# Patient Record
Sex: MALE | Race: WHITE | NOT HISPANIC OR LATINO | Employment: UNEMPLOYED | URBAN - METROPOLITAN AREA
[De-identification: names, ages, dates, MRNs, and addresses within clinical notes are randomized per-mention and may not be internally consistent; named-entity substitution may affect disease eponyms.]

---

## 2022-02-07 ENCOUNTER — OFFICE VISIT (OUTPATIENT)
Dept: INTERNAL MEDICINE | Facility: CLINIC | Age: 27
End: 2022-02-07
Payer: COMMERCIAL

## 2022-02-07 VITALS
HEART RATE: 118 BPM | OXYGEN SATURATION: 98 % | TEMPERATURE: 98.1 F | DIASTOLIC BLOOD PRESSURE: 83 MMHG | BODY MASS INDEX: 23.94 KG/M2 | SYSTOLIC BLOOD PRESSURE: 138 MMHG | WEIGHT: 171 LBS | RESPIRATION RATE: 20 BRPM | HEIGHT: 71 IN

## 2022-02-07 DIAGNOSIS — Z11.59 NEED FOR HEPATITIS C SCREENING TEST: ICD-10-CM

## 2022-02-07 DIAGNOSIS — Z11.4 SCREENING FOR HIV (HUMAN IMMUNODEFICIENCY VIRUS): ICD-10-CM

## 2022-02-07 DIAGNOSIS — F41.9 ANXIETY AND DEPRESSION: ICD-10-CM

## 2022-02-07 DIAGNOSIS — Z00.00 ROUTINE GENERAL MEDICAL EXAMINATION AT A HEALTH CARE FACILITY: Primary | ICD-10-CM

## 2022-02-07 DIAGNOSIS — F12.188 CANNABIS HYPEREMESIS SYNDROME CONCURRENT WITH AND DUE TO CANNABIS ABUSE (H): ICD-10-CM

## 2022-02-07 DIAGNOSIS — F32.A ANXIETY AND DEPRESSION: ICD-10-CM

## 2022-02-07 LAB
BASOPHILS # BLD AUTO: 0 10E3/UL (ref 0–0.2)
BASOPHILS NFR BLD AUTO: 0 %
EOSINOPHIL # BLD AUTO: 0.1 10E3/UL (ref 0–0.7)
EOSINOPHIL NFR BLD AUTO: 1 %
ERYTHROCYTE [DISTWIDTH] IN BLOOD BY AUTOMATED COUNT: 12.7 % (ref 10–15)
HCT VFR BLD AUTO: 46.5 % (ref 40–53)
HGB BLD-MCNC: 15.9 G/DL (ref 13.3–17.7)
HIV 1+2 AB+HIV1 P24 AG SERPL QL IA: NONREACTIVE
LYMPHOCYTES # BLD AUTO: 1.5 10E3/UL (ref 0.8–5.3)
LYMPHOCYTES NFR BLD AUTO: 17 %
MCH RBC QN AUTO: 30.5 PG (ref 26.5–33)
MCHC RBC AUTO-ENTMCNC: 34.2 G/DL (ref 31.5–36.5)
MCV RBC AUTO: 89 FL (ref 78–100)
MONOCYTES # BLD AUTO: 0.7 10E3/UL (ref 0–1.3)
MONOCYTES NFR BLD AUTO: 8 %
NEUTROPHILS # BLD AUTO: 6.4 10E3/UL (ref 1.6–8.3)
NEUTROPHILS NFR BLD AUTO: 73 %
PLATELET # BLD AUTO: 141 10E3/UL (ref 150–450)
RBC # BLD AUTO: 5.22 10E6/UL (ref 4.4–5.9)
WBC # BLD AUTO: 8.8 10E3/UL (ref 4–11)

## 2022-02-07 PROCEDURE — 99385 PREV VISIT NEW AGE 18-39: CPT | Performed by: INTERNAL MEDICINE

## 2022-02-07 PROCEDURE — 99214 OFFICE O/P EST MOD 30 MIN: CPT | Mod: 25 | Performed by: INTERNAL MEDICINE

## 2022-02-07 PROCEDURE — 86803 HEPATITIS C AB TEST: CPT | Performed by: INTERNAL MEDICINE

## 2022-02-07 PROCEDURE — 87389 HIV-1 AG W/HIV-1&-2 AB AG IA: CPT | Performed by: INTERNAL MEDICINE

## 2022-02-07 PROCEDURE — 80050 GENERAL HEALTH PANEL: CPT | Performed by: INTERNAL MEDICINE

## 2022-02-07 PROCEDURE — 36415 COLL VENOUS BLD VENIPUNCTURE: CPT | Performed by: INTERNAL MEDICINE

## 2022-02-07 PROCEDURE — 96127 BRIEF EMOTIONAL/BEHAV ASSMT: CPT | Performed by: INTERNAL MEDICINE

## 2022-02-07 ASSESSMENT — ENCOUNTER SYMPTOMS
HEMATOCHEZIA: 0
HEMATURIA: 0
WEAKNESS: 1
HEADACHES: 1
SORE THROAT: 0
DYSURIA: 0
CONSTIPATION: 0
ARTHRALGIAS: 1
FREQUENCY: 0
FEVER: 0
SHORTNESS OF BREATH: 1
NERVOUS/ANXIOUS: 1
PARESTHESIAS: 0
HEARTBURN: 1
DIZZINESS: 1
CHILLS: 0
EYE PAIN: 0
MYALGIAS: 1
NAUSEA: 1
COUGH: 1
JOINT SWELLING: 0
PALPITATIONS: 0
DIARRHEA: 0
ABDOMINAL PAIN: 1

## 2022-02-07 ASSESSMENT — PATIENT HEALTH QUESTIONNAIRE - PHQ9
SUM OF ALL RESPONSES TO PHQ QUESTIONS 1-9: 25
SUM OF ALL RESPONSES TO PHQ QUESTIONS 1-9: 25
10. IF YOU CHECKED OFF ANY PROBLEMS, HOW DIFFICULT HAVE THESE PROBLEMS MADE IT FOR YOU TO DO YOUR WORK, TAKE CARE OF THINGS AT HOME, OR GET ALONG WITH OTHER PEOPLE: EXTREMELY DIFFICULT

## 2022-02-07 ASSESSMENT — MIFFLIN-ST. JEOR: SCORE: 1767.78

## 2022-02-07 NOTE — PROGRESS NOTES
"SUBJECTIVE:   CC: Favio Will is an 27 year old male who presents for preventative health visit.       Patient has been advised of split billing requirements and indicates understanding: Yes  Patient is a 27-year-old  male who presents for his annual physical.  Patient does have several concerns today in regards to his anxiety and depression.  He is dealt with his mental health issues for the past several years.  Has previously utilized Klonopin on an as-needed basis for management of his anxiety, but he states that he stopped all medication as \"nothing ever works for me.\"  He would later states that he has not been on any other medications for management of his anxiety and depression.  Patient has been attempting to self medicate with very heavy marijuana use.  He has noted over the past 2 years that anytime he will smoke marijuana then he starts to feel very ill.  Patient has vomited multiple times after any type of marijuana use.  He also reports abdominal pain and a sense of feeling unwell.  Patient is very concerned that there is an underlying issue that has not yet been discovered that is making him feel so badly.  He states that he does struggle with frequent marijuana use, and he is currently smoking approximately 1/8 of marijuana each day.  He does report that he was smoking significantly more over the past several months, but he has run out of money.  Patient does live with his mother and stepfather, and he is currently unemployed.  He does state that he has a very fluctuant appetite.  His bowel habits can be very variable as well.  Patient reports that he does feel like he sleeps a lot, but he is always very tired.  He does report issues with depressed mood, anhedonia, excessive worrying, difficulty relaxing, lack of motivation, and concentration difficulties.  Patient is accompanied by his stepfather today who does help provide some of his history as the patient does get very fixated on " discussing how he feels after smoking marijuana.  Patient does state multiple times that he does not feel that medications will work for him, and he needs alternative treatment methods.  Patient also repeat several times that he just feels very sick.    Healthy Habits:     Getting at least 3 servings of Calcium per day:  NO    Bi-annual eye exam:  NO    Dental care twice a year:  NO    Sleep apnea or symptoms of sleep apnea:  Daytime drowsiness    Diet:  Regular (no restrictions)    Frequency of exercise:  None    Taking medications regularly:  Yes    Medication side effects:  None    PHQ-2 Total Score: 6    Additional concerns today:  No      Today's PHQ-2 Score:   PHQ-2 ( 1999 [employer]) 2/7/2022   Q1: Little interest or pleasure in doing things 3   Q2: Feeling down, depressed or hopeless 3   PHQ-2 Score 6   Q1: Little interest or pleasure in doing things Nearly every day   Q2: Feeling down, depressed or hopeless Nearly every day   PHQ-2 Score 6       Abuse: Current or Past(Physical, Sexual or Emotional)- No  Do you feel safe in your environment? Yes      Social History     Tobacco Use     Smoking status: Former Smoker     Packs/day: 0.50     Smokeless tobacco: Never Used   Substance Use Topics     Alcohol use: Yes     Comment: not much, sometimes on weekends       Alcohol Use 2/7/2022   Prescreen: >3 drinks/day or >7 drinks/week? No       Last PSA: No results found for: PSA    Reviewed orders with patient. Reviewed health maintenance and updated orders accordingly - Yes  Lab work is in process    Reviewed and updated as needed this visit by clinical staff  Tobacco  Allergies  Meds  Problems  Med Hx  Surg Hx  Fam Hx  Soc Hx         Reviewed and updated as needed this visit by Provider    Meds  Problems             Review of Systems   Constitutional: Negative for chills and fever.   HENT: Negative for congestion, ear pain, hearing loss and sore throat.    Eyes: Negative for pain and visual disturbance.  "  Respiratory: Positive for cough and shortness of breath.    Cardiovascular: Negative for chest pain, palpitations and peripheral edema.   Gastrointestinal: Positive for abdominal pain, heartburn and nausea. Negative for constipation, diarrhea and hematochezia.   Genitourinary: Negative for dysuria, frequency, genital sores, hematuria, impotence, penile discharge and urgency.   Musculoskeletal: Positive for arthralgias and myalgias. Negative for joint swelling.   Skin: Negative for rash.   Neurological: Positive for dizziness, weakness and headaches. Negative for paresthesias.   Psychiatric/Behavioral: Positive for mood changes. The patient is nervous/anxious.          OBJECTIVE:   /83   Pulse 118   Temp 98.1  F (36.7  C)   Resp 20   Ht 1.803 m (5' 11\")   Wt 77.6 kg (171 lb)   SpO2 98%   BMI 23.85 kg/m      Physical Exam  Vitals and nursing note reviewed.   HENT:      Head: Normocephalic and atraumatic.      Right Ear: Tympanic membrane, ear canal and external ear normal.      Left Ear: Tympanic membrane, ear canal and external ear normal.      Mouth/Throat:      Mouth: Mucous membranes are moist.      Pharynx: Oropharynx is clear.   Eyes:      Extraocular Movements: Extraocular movements intact.      Conjunctiva/sclera: Conjunctivae normal.      Pupils: Pupils are equal, round, and reactive to light.   Cardiovascular:      Rate and Rhythm: Normal rate and regular rhythm.      Pulses: Normal pulses.      Heart sounds: Normal heart sounds.   Pulmonary:      Effort: Pulmonary effort is normal.      Breath sounds: Normal breath sounds.   Abdominal:      General: Bowel sounds are normal.      Palpations: Abdomen is soft.   Musculoskeletal:      Cervical back: Normal range of motion and neck supple.   Skin:     General: Skin is warm.      Capillary Refill: Capillary refill takes less than 2 seconds.   Neurological:      General: No focal deficit present.      Mental Status: He is alert and oriented to " "person, place, and time.   Psychiatric:         Attention and Perception: He is inattentive.         Mood and Affect: Mood is anxious.         Speech: Speech is rapid and pressured.         Thought Content: Thought content normal.       Diagnostic Test Results: CMP, CBC, and TSH with reflex free T4 is pending.    ASSESSMENT/PLAN:   (Z00.00) Routine general medical examination at a health care facility  (primary encounter diagnosis)  Comment: At this time, patient does have a relatively unremarkable physical examination.  His blood pressure is noted to be in acceptable level.  Patient is not fasting, but we will have some lab work collected today.  He does have a CMP and CBC that is currently pending.  We did spend some time briefly discussing appropriate dietary and lifestyle modifications that can help keep his weight and blood pressure under good control.  We will contact him with results of his labs once they are available for review.    (Z11.4) Screening for HIV (human immunodeficiency virus)  Comment: HIV Antigen Antibody Combo is pending.    (Z11.59) Need for hepatitis C screening test  Comment: Hepatitis C Screen Reflex to HCV RNA Quant and         Genotype is pending.    (F41.9,  F32.A) Anxiety and depression  Comment: At this time, patient appears to be struggling significantly with anxiety and depression.  He would appear that he has been self-medicating with marijuana and other illicit substances over the past several years.  Patient is quite adamant that medications will not work for him, but he does admit that he has \"never really been tried on anything before.\"  After much discussion, we did lead to have baseline lab work collected to ensure that there are no other underlying issues that could be contributing to his sense of feeling unwell.  Once all lab results are available for review, then we can determine a better plan for management of his mental health issues.    (F12.188) Cannabis hyperemesis " "syndrome concurrent with and due to cannabis abuse (H)  Comment: I suspect that his issues with vomiting following marijuana use are likely related to cannabis hyperemesis syndrome.  I did stress with him the importance of stopping all marijuana use.  This was reiterated to the patient multiple times.    Patient has been advised of split billing requirements and indicates understanding: Yes    COUNSELING:   Reviewed preventive health counseling, as reflected in patient instructions    Estimated body mass index is 23.85 kg/m  as calculated from the following:    Height as of this encounter: 1.803 m (5' 11\").    Weight as of this encounter: 77.6 kg (171 lb).         He reports that he has quit smoking. He smoked 0.50 packs per day. He has never used smokeless tobacco.      Counseling Resources:  ATP IV Guidelines  Pooled Cohorts Equation Calculator  FRAX Risk Assessment  ICSI Preventive Guidelines  Dietary Guidelines for Americans, 2010  USDA's MyPlate  ASA Prophylaxis  Lung CA Screening    Calvin Merrill MD  [facility]  Answers for HPI/ROS submitted by the patient on 2/7/2022  If you checked off any problems, how difficult have these problems made it for you to do your work, take care of things at home, or get along with other people?: Extremely difficult  PHQ9 TOTAL SCORE: 25      "

## 2022-02-08 LAB
ALBUMIN SERPL-MCNC: 4.2 G/DL (ref 3.4–5)
ALP SERPL-CCNC: 80 U/L (ref 40–150)
ALT SERPL W P-5'-P-CCNC: 41 U/L (ref 0–70)
ANION GAP SERPL CALCULATED.3IONS-SCNC: 4 MMOL/L (ref 3–14)
AST SERPL W P-5'-P-CCNC: 13 U/L (ref 0–45)
BILIRUB SERPL-MCNC: 0.3 MG/DL (ref 0.2–1.3)
BUN SERPL-MCNC: 11 MG/DL (ref 7–30)
CALCIUM SERPL-MCNC: 9.3 MG/DL (ref 8.5–10.1)
CHLORIDE BLD-SCNC: 101 MMOL/L (ref 94–109)
CO2 SERPL-SCNC: 31 MMOL/L (ref 20–32)
CREAT SERPL-MCNC: 0.78 MG/DL (ref 0.66–1.25)
GFR SERPL CREATININE-BSD FRML MDRD: >90 ML/MIN/1.73M2
GLUCOSE BLD-MCNC: 92 MG/DL (ref 70–99)
HCV AB SERPL QL IA: NONREACTIVE
POTASSIUM BLD-SCNC: 4 MMOL/L (ref 3.4–5.3)
PROT SERPL-MCNC: 7.8 G/DL (ref 6.8–8.8)
SODIUM SERPL-SCNC: 136 MMOL/L (ref 133–144)
TSH SERPL DL<=0.005 MIU/L-ACNC: 0.6 MU/L (ref 0.4–4)

## 2022-02-08 ASSESSMENT — PATIENT HEALTH QUESTIONNAIRE - PHQ9: SUM OF ALL RESPONSES TO PHQ QUESTIONS 1-9: 25

## 2022-02-20 ENCOUNTER — HEALTH MAINTENANCE LETTER (OUTPATIENT)
Age: 27
End: 2022-02-20

## 2022-04-15 ENCOUNTER — HOSPITAL ENCOUNTER (EMERGENCY)
Facility: CLINIC | Age: 27
Discharge: HOME | End: 2022-04-15
Attending: EMERGENCY MEDICINE | Admitting: EMERGENCY MEDICINE
Payer: COMMERCIAL

## 2022-04-15 ENCOUNTER — APPOINTMENT (OUTPATIENT)
Dept: GENERAL RADIOLOGY | Facility: CLINIC | Age: 27
End: 2022-04-15
Attending: EMERGENCY MEDICINE
Payer: COMMERCIAL

## 2022-04-15 ENCOUNTER — APPOINTMENT (OUTPATIENT)
Dept: CT IMAGING | Facility: CLINIC | Age: 27
End: 2022-04-15
Attending: EMERGENCY MEDICINE
Payer: COMMERCIAL

## 2022-04-15 VITALS
TEMPERATURE: 99.5 F | WEIGHT: 163.8 LBS | DIASTOLIC BLOOD PRESSURE: 86 MMHG | OXYGEN SATURATION: 98 % | HEIGHT: 70 IN | HEART RATE: 92 BPM | RESPIRATION RATE: 14 BRPM | BODY MASS INDEX: 23.45 KG/M2 | SYSTOLIC BLOOD PRESSURE: 118 MMHG

## 2022-04-15 DIAGNOSIS — F19.10 SUBSTANCE ABUSE (H): ICD-10-CM

## 2022-04-15 DIAGNOSIS — J10.1 INFLUENZA A: ICD-10-CM

## 2022-04-15 LAB
ALBUMIN SERPL-MCNC: 3.8 G/DL (ref 3.4–5)
ALP SERPL-CCNC: 60 U/L (ref 40–150)
ALT SERPL W P-5'-P-CCNC: 88 U/L (ref 0–70)
ANION GAP SERPL CALCULATED.3IONS-SCNC: 5 MMOL/L (ref 3–14)
AST SERPL W P-5'-P-CCNC: 55 U/L (ref 0–45)
BASOPHILS # BLD AUTO: 0 10E3/UL (ref 0–0.2)
BASOPHILS NFR BLD AUTO: 0 %
BILIRUB SERPL-MCNC: 0.4 MG/DL (ref 0.2–1.3)
BUN SERPL-MCNC: 10 MG/DL (ref 7–30)
CALCIUM SERPL-MCNC: 8.7 MG/DL (ref 8.5–10.1)
CHLORIDE BLD-SCNC: 100 MMOL/L (ref 94–109)
CO2 SERPL-SCNC: 26 MMOL/L (ref 20–32)
CREAT SERPL-MCNC: 0.82 MG/DL (ref 0.66–1.25)
EOSINOPHIL # BLD AUTO: 0 10E3/UL (ref 0–0.7)
EOSINOPHIL NFR BLD AUTO: 0 %
ERYTHROCYTE [DISTWIDTH] IN BLOOD BY AUTOMATED COUNT: 12 % (ref 10–15)
FLUAV RNA SPEC QL NAA+PROBE: POSITIVE
FLUBV RNA RESP QL NAA+PROBE: NEGATIVE
GFR SERPL CREATININE-BSD FRML MDRD: >90 ML/MIN/1.73M2
GLUCOSE BLD-MCNC: 113 MG/DL (ref 70–99)
HCT VFR BLD AUTO: 43.2 % (ref 40–53)
HGB BLD-MCNC: 15 G/DL (ref 13.3–17.7)
HOLD SPECIMEN: NORMAL
IMM GRANULOCYTES # BLD: 0 10E3/UL
IMM GRANULOCYTES NFR BLD: 0 %
LIPASE SERPL-CCNC: 52 U/L (ref 73–393)
LYMPHOCYTES # BLD AUTO: 0.6 10E3/UL (ref 0.8–5.3)
LYMPHOCYTES NFR BLD AUTO: 12 %
MCH RBC QN AUTO: 30.6 PG (ref 26.5–33)
MCHC RBC AUTO-ENTMCNC: 34.7 G/DL (ref 31.5–36.5)
MCV RBC AUTO: 88 FL (ref 78–100)
MONOCYTES # BLD AUTO: 0.9 10E3/UL (ref 0–1.3)
MONOCYTES NFR BLD AUTO: 19 %
NEUTROPHILS # BLD AUTO: 3.4 10E3/UL (ref 1.6–8.3)
NEUTROPHILS NFR BLD AUTO: 69 %
NRBC # BLD AUTO: 0 10E3/UL
NRBC BLD AUTO-RTO: 0 /100
PLATELET # BLD AUTO: 94 10E3/UL (ref 150–450)
POTASSIUM BLD-SCNC: 3.5 MMOL/L (ref 3.4–5.3)
PROT SERPL-MCNC: 7.4 G/DL (ref 6.8–8.8)
RBC # BLD AUTO: 4.9 10E6/UL (ref 4.4–5.9)
RSV RNA SPEC NAA+PROBE: NEGATIVE
SARS-COV-2 RNA RESP QL NAA+PROBE: NEGATIVE
SODIUM SERPL-SCNC: 131 MMOL/L (ref 133–144)
WBC # BLD AUTO: 5 10E3/UL (ref 4–11)

## 2022-04-15 PROCEDURE — 96375 TX/PRO/DX INJ NEW DRUG ADDON: CPT

## 2022-04-15 PROCEDURE — C9803 HOPD COVID-19 SPEC COLLECT: HCPCS

## 2022-04-15 PROCEDURE — 83690 ASSAY OF LIPASE: CPT | Performed by: EMERGENCY MEDICINE

## 2022-04-15 PROCEDURE — 99285 EMERGENCY DEPT VISIT HI MDM: CPT | Mod: 25

## 2022-04-15 PROCEDURE — 250N000011 HC RX IP 250 OP 636: Performed by: EMERGENCY MEDICINE

## 2022-04-15 PROCEDURE — 250N000009 HC RX 250: Performed by: EMERGENCY MEDICINE

## 2022-04-15 PROCEDURE — 85025 COMPLETE CBC W/AUTO DIFF WBC: CPT | Performed by: EMERGENCY MEDICINE

## 2022-04-15 PROCEDURE — 74177 CT ABD & PELVIS W/CONTRAST: CPT

## 2022-04-15 PROCEDURE — 72040 X-RAY EXAM NECK SPINE 2-3 VW: CPT

## 2022-04-15 PROCEDURE — 96361 HYDRATE IV INFUSION ADD-ON: CPT

## 2022-04-15 PROCEDURE — 36415 COLL VENOUS BLD VENIPUNCTURE: CPT | Performed by: EMERGENCY MEDICINE

## 2022-04-15 PROCEDURE — 80053 COMPREHEN METABOLIC PANEL: CPT | Performed by: EMERGENCY MEDICINE

## 2022-04-15 PROCEDURE — 90791 PSYCH DIAGNOSTIC EVALUATION: CPT

## 2022-04-15 PROCEDURE — 87637 SARSCOV2&INF A&B&RSV AMP PRB: CPT | Performed by: EMERGENCY MEDICINE

## 2022-04-15 PROCEDURE — 96374 THER/PROPH/DIAG INJ IV PUSH: CPT | Mod: 59

## 2022-04-15 PROCEDURE — 258N000003 HC RX IP 258 OP 636: Performed by: EMERGENCY MEDICINE

## 2022-04-15 RX ORDER — IOPAMIDOL 755 MG/ML
500 INJECTION, SOLUTION INTRAVASCULAR ONCE
Status: COMPLETED | OUTPATIENT
Start: 2022-04-15 | End: 2022-04-15

## 2022-04-15 RX ORDER — ONDANSETRON 4 MG/1
4 TABLET, ORALLY DISINTEGRATING ORAL EVERY 6 HOURS PRN
Qty: 12 TABLET | Refills: 0 | Status: SHIPPED | OUTPATIENT
Start: 2022-04-15 | End: 2022-04-18

## 2022-04-15 RX ORDER — KETOROLAC TROMETHAMINE 15 MG/ML
15 INJECTION, SOLUTION INTRAMUSCULAR; INTRAVENOUS ONCE
Status: COMPLETED | OUTPATIENT
Start: 2022-04-15 | End: 2022-04-15

## 2022-04-15 RX ORDER — ONDANSETRON 2 MG/ML
4 INJECTION INTRAMUSCULAR; INTRAVENOUS ONCE
Status: DISCONTINUED | OUTPATIENT
Start: 2022-04-15 | End: 2022-04-15

## 2022-04-15 RX ORDER — ONDANSETRON 2 MG/ML
8 INJECTION INTRAMUSCULAR; INTRAVENOUS ONCE
Status: COMPLETED | OUTPATIENT
Start: 2022-04-15 | End: 2022-04-15

## 2022-04-15 RX ADMIN — SODIUM CHLORIDE 2000 ML: 9 INJECTION, SOLUTION INTRAVENOUS at 14:08

## 2022-04-15 RX ADMIN — SODIUM CHLORIDE 61 ML: 9 INJECTION, SOLUTION INTRAVENOUS at 14:41

## 2022-04-15 RX ADMIN — ONDANSETRON 8 MG: 2 INJECTION INTRAMUSCULAR; INTRAVENOUS at 14:24

## 2022-04-15 RX ADMIN — KETOROLAC TROMETHAMINE 15 MG: 15 INJECTION, SOLUTION INTRAMUSCULAR; INTRAVENOUS at 14:08

## 2022-04-15 RX ADMIN — IOPAMIDOL 82 ML: 755 INJECTION, SOLUTION INTRAVENOUS at 14:40

## 2022-04-15 NOTE — DISCHARGE INSTRUCTIONS
"  Aftercare Plan  If I am feeling unsafe or I am in a crisis, I will:   Contact my established care providers   Call the National Suicide Prevention Lifeline: 622.926.6357   Go to the nearest emergency room   Call 911     Warning signs that I or other people might notice when a crisis is developing for me:   Suicidal ideation with thoughts or intent  Using substances  Overusing substances prescribed to you  Neglecting self care-eating, drinking, bathing, sleeping    Things I am able to do on my own to cope or help me feel better:   Listen to Music  Go for a walk  Exercise  Deep breathing  Meditation  Journaling   Make a list of goals/dreams  List of reasons to stay sober    Things that I am able to do with others to cope or help me feel better:   Go to social events  Talk with family/friends     Things I can use or do for distraction: Music  Family     Changes I can make to support my mental health and wellness:   Individual therapy  C/D treatment/Rule 25 assessment  Day treatment or Partial Hospitalization Programs     People in my life that I can ask for help: Mom, dad, step-father, sister     Your [facility] has a mental health crisis team you can call 24/7: [facility] Crisis  [phone]    Crisis Lines  Crisis Text Line  Text 669686  You will be connected with a trained live crisis counselor to provide support.    Por espanol, texto  NEREYDA a 787881 o texto a 442-AYUDAME en [facility] Hope Line  1.800.SUICIDE [0980162]      Community Resources  Fast Tracker  Linking people to mental health and substance use disorder resources  fasttrackermn.org     [place] Mental [facility] Warm Line  Peer to peer support  Monday thru Saturday, 12 pm to 10 pm  079.912.8246 or 1.459.943.6704  Text \"Support\" to 82956    National [place] on Mental Illness (DARIO)  680.752.9213 or 1.888.DARIO.HELPS    Mental Health Apps  My3  https://myBeijing Yiyang Huizhi Technologypp.org/    VirtualAxonifyeBox  " https://Yoox Group/apps/virtual-hope-box/      Additional Information  Today you were seen by a licensed mental health professional through Triage and Transition services, [facility] (P)  for a crisis assessment in the Emergency Department at [facility].  It is recommended that you follow up with your established providers (psychiatrist, mental health therapist, and/or primary care doctor - as relevant) as soon as possible. Coordinators from [facility] will be calling you in the next 24-48 hours to ensure that you have the resources you need.  You can also contact [facility] coordinators directly at [phone]. You may have been scheduled for or offered an appointment with a mental health provider. [facility] maintains an extensive network of licensed behavioral health providers to connect patients with the services they need.  We do not charge providers a fee to participate in our referral network.  We match patients with providers based on a patient's specific needs, insurance coverage, and location.  Our first effort will be to refer you to a provider within your care system, and will utilize providers outside your care system as needed.        Partial hospitalization programs:    [facility] 9- all adult programming is virtual at this time   Ph: [phone]     Partial Hospitalization Program   5 hours per day, 5 days per week for 2-3 weeks.     Adult Day Treatment   3 days per week, 3 hours per day. 10 specialty tracks to customize care.     Mental Illness and Substance Abuse   3 hours per day, 5 days per week, about 6-8 weeks.     55+ Day Treatment   2 days/week, 4 hours/day.    5 specialty tracks to customize care.     [address] [address]     [facility] Day Treatment Programming (6/17/2021 Most [facility] programming is Virtual currently, but [facility] offers Hybrid.)     [facility]-  Expressive Arts and Mindfulness Day Treatment Program   Ph: [phone]      Programming occurs Monday through Friday, either 9:15 a.m. - 12:20 p.m., or 1-3:30 p.m. Participants will be scheduled for three days of the week. It is located on the fourth floor of the [facility]. Call to schedule an hour-long intake appointment and learn more about the program.     [facility]?   [address],?   [place] [address]?     [facility]  (3- Program has both virtual and in person options)        [facility] Day Treatment Program     Ph: [phone] FAX: [phone]     Patients attend this program 4 times per week, Monday through Friday, for 3 hours per day. There are morning and afternoon sessions. Participation usually lasts 2-6 months depending on the patient s needs. Three program tracks are offered: Dialectical Behavior Therapy (DBT) - Informed, Cognitive Behavioral Therapy for Mental and Chemical Health, and Cognitive Behavioral Therapy for Psychosis.     An external referral for can be submitted on your behalf.     [email or web address]        [facility] Level 4   [address] [place] [address] [address] [place] [address] (3/26/2021 Programming is virtual currently) PH: [phone]     Intensive Outpatient Program (IOP) includes group therapy, psychiatry, skills learning and movement therapy. Length of participation is 6 weeks but is tailored based on individual needs and clinical recommendations. Programming meets Monday- Thursday. The Mind-Body group meets mornings from 9:00am-11:00am, and Trauma Focused group meets afternoons from 1:30pm-3:30pm.     [address], [address]     [place] [address]           [facility] 10/14/2021 Programming is hybrid of in person and virtual      Adult Day Treatment (ADT) and Mother Baby Intensive Outpatient Program     [facility] Ph: [phone] [facility] Ph: [phone]     www.[facility]          Programming is for individuals needing more intensive structure and support, often a step down from an inpatient setting or partial hospitalization, or a step up from less intensive services. Participants attend between 3 - 5 days per week for 8 - 12 weeks, either morning or afternoon session. Groups are held Monday - Friday. Morning Group is 9:00am - 12:00pm, and Afternoon Group is 1:00pm - 4:00pm.    [facility] also offers their Mother Baby Intensive Outpatient Program, which meets from 9:30am - 12:30pm for 5 days a week for up to 8 weeks. Babies up to age 1 are welcome to join mom.       [facility]   [address] [place] [address]     [facility]     [address] [facility] [address]   [place] [address]     [facility] In person programming available   Ph: [phone] Fax: [phone]       This Intensive Outpatient Program, (IOP) provides three hours of treatment each scheduled program day. It meets 12:45pm to 4pm Wednesday to Friday and consists of skill-based training and group psychotherapy. Length of stay is 24 to 30 program days. Participants have access to a psychiatric provider at least once per week    [address] [facility]   [address]   [place] [address]     [facility][note 3] Mix of In-Person and Virtual programming depending on location, i.e. [place] has 2 adult MH tracks that are in person, morning and afternoon option     Ph: [phone] or [phone]   [email or web address]     Patients participate in group programming while working on an individualized treatment plan. Treatment is supported by a multi-disciplinary team including Psychiatrists, Therapists, and Social Workers. Multiple tracks of therapeutic programming offered including the  treatment modalities of: Adapted Dialectical Behavior Therapy (A-DBT), Acceptance and Commitment Therapy (ACT), Cognitive Behavior Therapy (CBT), Narrative Therapy, and therapy integrating the needs of those living with mental illness and chemical dependency.          Hours of IOP programming at 8:45am - 12:00pm or 12:45pm - 4:00pm, Monday - Friday, or three days a week, varies by program. Averages length of stay is 24-27 treatment days.     Specialized IOP Tracks include: Adapted DBT/ACT Program ([facility]),  IOP?at the? Mental Health Clinic?([facility]), and First Episode Psychosis?Intensive Outpatient Program ([place]).          [facility] Office Building     [address] [place] [address] [address] [place] [address]     [address] [place] [address]          [facility]     Ph: [phone]     Fax: [phone]     [email or web address]     This Intensive Outpatient Program (IOP) is a Dual Diagnosis recovery programming for members of the LGBTQ Family. This program is often a step-down following participation in residential programming. Patients work with counseling team in group and individual settings. Group meets for three hours, and there are day, afternoon, and evening options that meet Monday - Thursday. 9 - 12 weeks.   [address] [facility] [address], [address]   [place] [address]       [facility] (in-person IOP options)     Ph: [phone] or [phone]   Fax [phone]     [place] offers two intensive outpatient program (IOP) tracks: Focus Depression Recovery IOP, and OCD and Anxiety IOP. Both programs meet Monday - Friday from 3:00pm - 6:00pm, and participation typically lasts for 6 - 8 weeks. Partial Hospitalization level of care is also available for these care tracks (meets for 6 to 7 hours a day/4 to 5 days a week, 4 to 6 weeks).   [address] [facility],     [place] [address]     [facility] All group programming is virtual at this time      [facility]     Ph: [phone] for clinic, or Caron Martino [phone]   Fax: [phone]    [email or web address]    The STAT Program operates 3   hours a day, 4 days a week on a 10 session rotating schedule. Group hours are Monday - Thursday from 9:00am - 12:30pm.     Clients in the [facility] program attend one to five days each week for a minimum of three hours each day. Frequency depends on the clients treatment needs.  An assessment determines the client s treatment plan and which day treatment and supplemental services will best address their needs. Morning, afternoon, and evening sessions may be available.  Tracks included Mornings, 9am-12pm Mon, Wed, Thurs   [address] [place] [address]        [facility] - In Person   Ph: [phone] Fax: [phone]     This Intensive Outpatient Program, (IOP) provides three hours of treatment each scheduled program day. It meets 12:45pm to 4pm Wednesday to Friday and consists of skill-based training and group psychotherapy. Length of stay is 24 to 30 program days. Participants have access to a psychiatric provider at least once per week      [address] [place] [address]     [facility]    Ph: [phone] Fax: [phone]     Adult Intensive Outpatient Program (IOP) for Depression, Anxiety and/or Trauma is group based treatment for adults age 17 and older. Programming meets 3-4 days per week for 3 hours per day.     Adult IOP for Eating Disorders is for adults age 17 and older. Programming meets 4 days per week. Prior to participation, individuals complete a comprehensive assessment to help the treatment team develop an individualized plan. Participants may concurrently participate in the Depression/Anxiety/Trauma programming if needed. As of  September, 2019 this program does not contract with [facility].     [address] [place] [address]     [facility] (6/17/2021 [facility] Hybrid programming, a mix of in person and virtual)   Adult Day Treatment Program at [facility]   Ph: [phone]      Programming occurs Monday through Friday mornings from 9 a.m. to Noon. Length of treatment is based on individual needs. For patients experiencing a psychiatric illness that makes day-to-day functioning difficult who find that outpatient therapy does not provide enough support. Call for information on how to participate.        [facility]   [address] [place] [address]     [facility]   Intensive Outpatient Program (MI/CD)   Ph: [phone] Fax: [phone]     Program length is based on client needs. Group sessions are held during the morning from 9:00am - 12:00pm, or in the afternoon from 1:00pm - 4:00pm on Mondays, Tuesdays, and Thursdays. To enroll in the program, clients or providers should call to schedule an initial assessment.     [address]   [place] [address]      [facility] 10/14/2021 Programming is hybrid of in person and virtual     Adult Day Treatment (ADT)   Ph: [phone]   [email or web address] a [email or web address]     Programming is for individuals needing more intensive structure and support, often a step down from an inpatient setting or partial hospitalization, or a step up from less intensive services. Participants attend between 3 - 5 days per week for 8 - 12 weeks, either morning or afternoon session. Groups are held Monday - Friday. Morning Group is 9:00am - 12:00pm, and Afternoon Group is 1:00pm - 4:00pm.      [facility]   [address]   [place] [address]     [facility] [employer] (4/9/2021 Program meets virtually on Zoom meetings, may transition to in person in July)     [facility] Intensive  Outpatient Program     Ph: [phone] Fax: [phone]   This group offers weekly rotating enrollment. Sessions are 3 hours long and meet 4 days per week from 9:00am - Noon. Participation typically lasts for 8 weeks. Participants are supported by weekly psychotherapy and psychiatry appointments. Call the clinic to schedule an intake appointment or for more information.     [address]   [place] [address]     [facility]     [facility]   Ph: [phone] Fax: [phone]   [facility] offers several day treatment services:   Connections and Day One Treatment is a 12 week program that meets 4 days per week on Monday, Tuesday, & Thursday from 9:00am - 12:30pm, and Friday from 9:00am - 12:00pm. Extended aftercare support groups are available     DBT Informed Adult Day Treatment is a 5 month that meets 3 - 5 days per week on Monday, Wednesday, and Friday from 9:00am - 12:30pm. Extended aftercare support groups are available.     Co-Occurring Substance Use and Mental Health Program participants attend for a minimum of 12 weeks on Tuesday, Wednesday, and Thursday from 1:00pm - 4:00pm.     [address] [place] [address]   [facility] 10/14/2021 Programming is hybrid of in person and virtual     Adult Day Treatment (ADT)   Ph: [phone]   [email or web address]   Programming is for individuals needing more intensive structure and support, often a step down from an inpatient setting or partial hospitalization, or a step up from less intensive services. Participants attend between 3 - 5 days per week for 8 - 12 weeks, either morning or afternoon session. Groups are held Monday - Friday. Morning Group is 9:00am - 12:00pm, and Afternoon Group is 1:00pm - 4:00pm.         [facility]   [address]   [place] [address]   [facility] Mix of In-Person and Virtual programming depending on location     Ph:  [phone] or [phone]   Fax: [phone]     [email or web address]     Patients participate in group programming while working on an individualized treatment plan. Treatment is supported by a multi-disciplinary team including Psychiatrists, Therapists, and Social Workers. Multiple tracks of therapeutic programming offered including the treatment modalities of: Adapted Dialectical Behavior Therapy (A-DBT), Acceptance and Commitment Therapy (ACT), Cognitive Behavior Therapy (CBT), Narrative Therapy, and therapy integrating the needs of those living with mental illness and chemical dependency.     Hours of IOP programming at 8:45am - 12:00pm or 12:45pm - 4:00pm, Monday - Friday, or three days a week, varies by program. Averages length of stay is 24-27 treatment days.     Specialized IOP Tracks include: Adapted Dialectical Behavior Therapy?([place]), and Co-occurring Mental Illness/Chemical Dependency?- Evening Track ([place]). Also offers Young Adult IOP for 18-30 year olds.     [address] [place] [address]        RULE 25/C/D Treatment  In order to get into chemical dependency treatment you must follow these steps:   Complete a Rule 25 assessment with any of the providers listed below.     [facility]  [phone]  [address] [place] [address]  Patients with Medical Assistance from [facility] or [facility] are covered, otherwise you need to have a PMAP or private insurance.  Rule 25s are available at clinics in [facility], and [place].  Call ahead to schedule an appointment, no walk in availability.     [facility]  [phone]  [address] [address]  Assessments done Monday through Friday 8am-7pm.  Call to schedule an appointment, walk ins are accommodated if possible.  Insurance coverage is verified.  If insurance covers 80 percent or more a payment schedule may be  arranged with the business office.     [facility]  [phone]  [address] [place] [address]  Call to schedule an assessment.  Locations include [facility], [place], [facility], and [place].    [employer]  [phone] - Press 1  Call to schedule an assessment.  No walk in availability.  Only able to accept PMAP and private insurance.  Locations include [place], [place], [place], [place], [place], [place], [place], [place], [facility], and [place].    THERAPY SERVICES  [facility]  [address]  [address]  [place] [address]  [phone]    [facility]  [address] [place] [address], [address]  [facility] [address]   P [phone]  F [phone]    [facility]  [facility] [address]  [address], [place] [address]  [phone]  Inspiringchange.    Shelters:  [facility] [place] [address]  [phone]  Transitional housing, roberta based alternative recovery program for men.    [facility]  [place] [address]  [phone]  Transitional Housing, Emergency Shelter    [facility] [facility]  [place] [address]  [phone]  [place] [facility].      [facility]  [place] [address] [phone]  Transitional Housing, Non Profit Organization

## 2022-04-15 NOTE — ED NOTES
Pt. Reports chronic suicidal ideation with chronic anxiety and depression. Pt. Denies intent, plan, and means to end life or harm self.

## 2022-04-15 NOTE — ED NOTES
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 integrating the needs of those living with mental illness and chemical dependency.          Hours of IOP programming at 8:45am - 12:00pm or 12:45pm - 4:00pm, Monday - Friday, or three days a week, varies by program. Averages length of stay is 24-27 treatment days.     Specialized IOP Tracks include: Adapted DBT/ACT Program ([facility]),  IOP?at the? Mental Health Clinic?([facility]), and First Episode Psychosis?Intensive Outpatient Program ([place]).          [facility] Office Building     [address] [place] [address]     [facility] [place]     [address] [place] [address]          [facility]     Ph: [phone]     Fax: [phone]     [email or web address]     This Intensive Outpatient Program (IOP) is a Dual Diagnosis recovery programming for members of the LGBTQ Family. This program is often a  step-down following participation in residential programming. Patients work with counseling team in group and individual settings. Group meets for three hours, and there are day, afternoon, and evening options that meet Monday - Thursday. 9 - 12 weeks.          [address], [address]     [place] [address]          [facility] (in-person IOP options)     Ph: [phone] or [phone]     Fax [phone]          [facility] offers two intensive outpatient program (IOP) tracks: Focus Depression Recovery IOP, and OCD and Anxiety IOP. Both programs meet Monday - Friday from 3:00pm - 6:00pm, and participation typically lasts for 6 - 8 weeks. Partial Hospitalization level of care is also available for these care tracks (meets for 6 to 7 hours a day/4 to 5 days a week, 4 to 6 weeks).          [address], [address],      [place] [address]     [place]  [facility] All group programming is virtual at this time      [facility]     Ph: [phone] for clinic, or Caron Martino [phone]     Fax: [phone]      [email or web address]          The STAT Program operates 3   hours a day, 4 days a week on a 10 session rotating schedule. Group hours are Monday - Thursday from 9:00am - 12:30pm.          Clients in the [facility] program attend one to five days each week for a minimum of three hours each day. Frequency depends on the clients treatment needs.  An assessment determines the client s treatment plan and which day treatment and supplemental services will best address their needs. Morning, afternoon, and evening sessions may be available.  Tracks included Mornings, 9am-12pm Mon, Wed, Thurs          [address] [place] [address]          [facility] - In Person     Ph: [phone] Fax: [phone]          This Intensive Outpatient Program, (IOP) provides three hours of treatment each scheduled program  day. It meets 12:45pm to 4pm Wednesday to Friday and consists of skill-based training and group psychotherapy. Length of stay is 24 to 30 program days. Participants have access to a psychiatric provider at least once per week          [address] [place] [address], [place] [address]          [facility]      Ph: [phone] Fax: [phone]          Adult Intensive Outpatient Program (IOP) for Depression, Anxiety and/or Trauma is group based treatment for adults age 17 and older. Programming meets 3-4 days per week for 3 hours per day.          Adult IOP for Eating Disorders is for adults age 17 and older. Programming meets 4 days per week. Prior to participation, individuals complete a comprehensive assessment to help the treatment team develop an individualized plan. Participants may concurrently participate in the Depression/Anxiety/Trauma programming if needed. As of September, 2019 this program does not contract with [facility].          [address] [place] [address]     [place]  [facility] (6/17/2021 [facility] Hybrid programming, a mix of in person and virtual)          Adult Day Treatment Program at [facility]     Ph: [phone]      Programming occurs Monday through Friday mornings from 9 a.m. to Noon. Length of treatment is based on individual needs. For patients experiencing a psychiatric illness that makes day-to-day functioning difficult who find that outpatient therapy does not provide enough support. Call for information on how to participate.      [facility] [address] [place] [address]     [facility]   Intensive Outpatient Program (MI/CD)   Ph: [phone] Fax: [phone]     Program length is based on client needs. Group sessions are held during the morning from 9:00am - 12:00pm, or in the afternoon from 1:00pm - 4:00pm on Mondays, Tuesdays, and Thursdays. To enroll  in the program, clients or providers should call to schedule an initial assessment.     [address], [address]   [place] [address]      [facility] 10/14/2021 Programming is hybrid of in person and virtual     Adult Day Treatment (ADT)   Ph: [phone]   [email or web address]     Programming is for individuals needing more intensive structure and support, often a step down from an inpatient setting or partial hospitalization, or a step up from less intensive services. Participants attend between 3 - 5 days per week for 8 - 12 weeks, either morning or afternoon session. Groups are held Monday - Friday. Morning Group is 9:00am - 12:00pm, and Afternoon Group is 1:00pm - 4:00pm.      [facility]   [address], [address]   [place] [address]     [facility] (4/9/2021 Program meets virtually on Zoom meetings, may transition to in person in July)     [facility] Intensive Outpatient Program     Ph: [phone] Fax: [phone]   This group offers weekly rotating enrollment. Sessions are 3 hours long and meet 4 days per week from 9:00am - Noon. Participation typically lasts for 8 weeks. Participants are supported by weekly psychotherapy and psychiatry appointments. Call the clinic to schedule an intake appointment or for more information.     [facility]   [address]   [place] [address]     [facility]     [facility]   Ph: [phone] Fax: [phone]   [facility] offers several day treatment services:   Connections and Day One Treatment is a 12 week program that meets 4 days per week on Monday, Tuesday, & Thursday from 9:00am - 12:30pm, and Friday from 9:00am - 12:00pm. Extended aftercare support groups are available     DBT Informed Adult Day Treatment is a 5 month that meets 3 - 5 days per week on Monday, Wednesday, and Friday from 9:00am - 12:30pm. Extended aftercare support groups are available.      Co-Occurring Substance Use and Mental Health Program participants attend for a minimum of 12 weeks on Tuesday, Wednesday, and Thursday from 1:00pm - 4:00pm.     [address] [place] [address]   [facility] 10/14/2021 Programming is hybrid of in person and virtual     Adult Day Treatment (ADT)   Ph: [phone]   [email or web address] Affairs [email or web address] Corporation   Programming is for individuals needing more intensive structure and support, often a step down from an inpatient setting or partial hospitalization, or a step up from less intensive services. Participants attend between 3 - 5 days per week for 8 - 12 weeks, either morning or afternoon session. Groups are held Monday - Friday. Morning Group is 9:00am - 12:00pm, and Afternoon Group is 1:00pm - 4:00pm.         [address], [address]   [place] [address]   [facility] Mix of In-Person and Virtual programming depending on location     Ph: [phone] or [phone]   Fax: [phone]     [email or web address]     Patients participate in group programming while working on an individualized treatment plan. Treatment is supported by a multi-disciplinary team including Psychiatrists, Therapists, and Social Workers. Multiple tracks of therapeutic programming offered including the treatment modalities of: Adapted Dialectical Behavior Therapy (A-DBT), Acceptance and Commitment Therapy (ACT), Cognitive Behavior Therapy (CBT), Narrative Therapy, and therapy integrating the needs of those living with mental illness and chemical dependency.     Hours of IOP programming at 8:45am - 12:00pm or 12:45pm - 4:00pm, Monday - Friday, or three days a week, varies by program. Averages length of stay is 24-27 treatment days.     Specialized IOP Tracks include: Adapted Dialectical Behavior Therapy?([place]), and Co-occurring Mental Illness/Chemical Dependency?- Evening Track ([place]). Also offers Young Adult IOP for 18-30 year olds.     [address]  [place] [address]        RULE 25/C/D Treatment  In order to get into chemical dependency treatment you must follow these steps:  1.  Complete a Rule 25 assessment with any of the providers listed below.     [facility]  [phone]  [address] [place] [address]  Patients with Medical Assistance from [place] or [facility] are covered, otherwise you need to have a PMAP or private insurance.  Rule 25s are available at clinics in [place], [facility], and [place].  Call ahead to schedule an appointment, no walk in availability.     [facility]  [phone]  [address] [address]  Assessments done Monday through Friday 8am-7pm.  Call to schedule an appointment, walk ins are accommodated if possible.  Insurance coverage is verified.  If insurance covers 80 percent or more a payment schedule may be arranged with the business office.     [facility]  [phone]  [address] [place] [address]  Call to schedule an assessment.  Locations include [facility], [place], [place], and [place].    [facility]  [phone] - Press 1  Call to schedule an assessment.  No walk in availability.  Only able to accept PMAP and private insurance.  Locations include [place], [facility], [place], [place], [place], [place], [place], [place], [place], and [place].    THERAPY SERVICES  [facility]  [address]  [address]  [place] [address]  [phone]    [facility]  [address] [place] [address], [address]  [facility] [address]   P [phone]  F [phone]    [facility]  [address], [place] [address]  [phone]  [email or web address]    Shelters:  [facility] [place] [address]  [phone]  Transitional housing, roberta based alternative recovery program for men.    [facility] [place] [facility]  [place] [address]  [phone]  Transitional Housing, Emergency Shelter    [place] [facility]  [place] [address]  [phone]  [place] [facility].      [facility] [place] [address]  [phone]  Transitional Housing, Non Profit Organization

## 2022-04-15 NOTE — ED TRIAGE NOTES
Pt presents for complaint of nausea and vomiting. Pt states he recently was binge drinking from Sunday to Wednesday. Pt states nausea and vomiting during this binge drinking. Pt also states he fell yesterday. Family is concerned about his mariajuana usage and patient states he has not been using for the past month due to inability to access. ABC intact, A&Ox4.    Pt's family also would like the patient to be evaluated for mental health issues. They are concerned about the patients substance use including alcohol, xanax and mariajuana.

## 2022-04-15 NOTE — ED PROVIDER NOTES
History     Chief Complaint:  Nausea & Vomiting       HPI   Favio Will is a 27 year old male with history of marijuana abuse, opiate abuse, benzo abuse who presents today with nausea, vomiting, low-grade fever.  He has a history of opiate abuse that he has been off of for several years.  He has been abusing benzos, notably the last several days, as well as chronically.  He also notes he is a chronic marijuana abuser however has not had any the past couple weeks because he did not have the money to purchase it.  Starting Sunday, 5 days ago, he began binge drinking and binge drank through 2 days ago.  During this time he started having nausea and vomiting.  He is continuing to have nausea and vomiting as well as some abdominal discomfort.  He denies chest pain, shortness of breath, urinary symptoms.  His mother is here with him and very concerned about his cycle of substance use.  He does endorse chronic suicidal ideation though denies any actual thought, plan, intent.    ROS:  Review of Systems  Positive-nausea, vomiting, abdominal pain, chronic suicidal ideation  Negative-chest pain, shortness of breath, urinary symptoms  Remaining 10 point ROS negative    Allergies:  No Known Drug Allergies     Medications:    ondansetron (ZOFRAN ODT) 4 MG ODT tab        Past Medical History:    Past Medical History:   Diagnosis Date     Chemical dependency (H)      Other specified visual disturbances      Patient Active Problem List   Diagnosis     Other specified visual disturbances     Seasonal allergic rhinitis     Molluscum contagiosum     Benzodiazepine withdrawal with perceptual disturbance (H)        Past Surgical History:    Past Surgical History:   Procedure Laterality Date     NO HISTORY OF SURGERY          Family History:    family history is not on file.    Social History:   reports that he has quit smoking. He smoked 0.50 packs per day. He has never used smokeless tobacco. He reports current alcohol use. He  "reports current drug use. Drug: Marijuana.  PCP: No Ref-Primary, Physician     Physical Exam     Patient Vitals for the past 24 hrs:   BP Temp Pulse Resp SpO2 Height Weight   04/15/22 1606 -- -- 93 14 98 % -- --   04/15/22 1605 -- -- 97 15 98 % -- --   04/15/22 1530 -- -- 94 19 96 % -- --   04/15/22 1529 -- -- -- -- 94 % -- --   04/15/22 1528 120/77 -- 95 -- -- -- --   04/15/22 1508 -- -- -- 29 98 % -- --   04/15/22 1507 -- -- 97 15 98 % -- --   04/15/22 1506 -- -- 99 16 98 % -- --   04/15/22 1505 -- -- 94 18 97 % -- --   04/15/22 1504 -- -- 97 20 96 % -- --   04/15/22 1503 -- -- 95 10 97 % -- --   04/15/22 1452 130/83 -- 97 -- 99 % -- --   04/15/22 1433 -- -- -- 29 98 % -- --   04/15/22 1432 -- -- 100 27 97 % -- --   04/15/22 1431 -- -- 99 21 -- -- --   04/15/22 1242 (!) 133/96 (!) 100.9  F (38.3  C) (!) 123 16 96 % 1.778 m (5' 10\") 74.3 kg (163 lb 12.8 oz)        Physical Exam  General/Appearance: appears stated age, well-groomed, appears comfortable and warm to the touch  Eyes: EOMI, no scleral injection, no icterus  ENT: dry oral mucosa  Neck: supple, nl ROM, no stiffness  Cardiovascular: tachy but regular, nl S1S2, no m/r/g, 2+ pulses in all 4 extremities, cap refill <2sec  Respiratory: CTAB, good air movement throughout, no wheezes/rhonchi/rales, no increased WOB, no retractions  GI: abd soft, non-distended, upper abd ttp, no HSM, no rebound, no guarding, nl BS  MSK: ORDOÑEZ, good tone, no bony abnormality  Skin: warm and well-perfused, no rash, no edema, no ecchymosis, nl turgor  Neuro: GCS 15, alert and oriented, no gross focal neuro deficits  Psych: Anxious with mild pressured speech, suicidal ideation without any active plan, poor insight and poor judgment  Heme: no petechia, no purpura, no active bleeding        Emergency Department Course   Imaging:  Cervical spine XR, 2-3 views   Final Result   IMPRESSION: C7-T1 not visualized on the lateral view due to   superimposed structures. Above this there is " straightening of the   usual cervical lordosis without subluxation. No fracture is evident   but please note that radiography is relatively insensitive for   cervical spine fracture and if the patient meets clinical criteria for   cervical spine imaging to exclude fracture, cervical spine CT is   recommended.      JONO SAEED MD            SYSTEM ID:  [id number]      CT Abdomen Pelvis w Contrast   Final Result   IMPRESSION:    1.  No acute cause for abdominal pain demonstrated.   2.  Fatty infiltration of the liver noted.      DUC DUNAWAY MD            SYSTEM ID:  [id number]         Report per radiology    Laboratory:  Labs Ordered and Resulted from Time of ED Arrival to Time of ED Departure   COMPREHENSIVE METABOLIC PANEL - Abnormal       Result Value    Sodium 131 (*)     Potassium 3.5      Chloride 100      Carbon Dioxide (CO2) 26      Anion Gap 5      Urea Nitrogen 10      Creatinine 0.82      Calcium 8.7      Glucose 113 (*)     Alkaline Phosphatase 60      AST 55 (*)     ALT 88 (*)     Protein Total 7.4      Albumin 3.8      Bilirubin Total 0.4      GFR Estimate >90     LIPASE - Abnormal    Lipase 52 (*)    INFLUENZA A/B & SARS-COV2 PCR MULTIPLEX - Abnormal    Influenza A PCR Positive (*)     Influenza B PCR Negative      RSV PCR Negative      SARS CoV2 PCR Negative     CBC WITH PLATELETS AND DIFFERENTIAL - Abnormal    WBC Count 5.0      RBC Count 4.90      Hemoglobin 15.0      Hematocrit 43.2      MCV 88      MCH 30.6      MCHC 34.7      RDW 12.0      Platelet Count 94 (*)     % Neutrophils 69      % Lymphocytes 12      % Monocytes 19      % Eosinophils 0      % Basophils 0      % Immature Granulocytes 0      NRBCs per 100 WBC 0      Absolute Neutrophils 3.4      Absolute Lymphocytes 0.6 (*)     Absolute Monocytes 0.9      Absolute Eosinophils 0.0      Absolute Basophils 0.0      Absolute Immature Granulocytes 0.0      Absolute NRBCs 0.0          Emergency Department Course:    Reviewed:  I reviewed  nursing notes and vitals    Assessments:   I obtained history and examined the patient as noted above.   1720 Spoke with DEC. Pt declining partial hospitalization.  1735 I rechecked the patient and explained findings. Pt would prefer to be discharged than stay in Obs.     Consults:   DEC    Interventions:  Medications   0.9% sodium chloride BOLUS (2,000 mLs Intravenous New Bag 4/15/22 1408)   ketorolac (TORADOL) injection 15 mg (15 mg Intravenous Given 4/15/22 1408)   ondansetron (ZOFRAN) injection 8 mg (8 mg Intravenous Given 4/15/22 1424)   CT Scan Flush (61 mLs Intravenous Given 4/15/22 1441)   iopamidol (ISOVUE-370) solution 500 mL (82 mLs Intravenous Given 4/15/22 1440)        Disposition:  The patient was discharged to home.     Impression & Plan      Medical Decision Making:  This patient is a 27-year-old male with significant substance abuse history including opiates, recent alcohol and recent benzodiazepine abuse who presents today with low-grade fever and a mild cough.  He also notably has been out of his benzos the past couple days and has been binge drinking as he was not able to access his marijuana secondary to no money.  Here he had a low-grade fever but was not vitally otherwise unstable.  His Covid swab came back negative but his influenza A came back positive.  I suspect this is what is causing his medical symptoms of fever and cough.  Otherwise I suspect the remainder of his symptoms are secondary to the alcohol, the benzos, the marijuana.  We did have our mental health  speak with him.  She agrees that the patient, even though he would benefit from partial hospitalization, is not holdable.  The patient does not wish to go into partial hospitalization right now.  I spoke with him and offered observation stay for IV hydration but, since I am not willing to write for a 72-hour benzo taper, he is not wanting to stay.  I did let him know that we would not be giving him benzos nor opiates if he  were to stay.  At this point in time he would like to be discharged home with Zofran.  Diagnosis:    ICD-10-CM    1. Substance abuse (H)  F19.10    2. Influenza A  J10.1         Discharge Medications:  New Prescriptions    ONDANSETRON (ZOFRAN ODT) 4 MG ODT TAB    Take 1 tablet (4 mg) by mouth every 6 hours as needed for nausea        4/15/2022   Leanna Briscoe*        Leanna Briscoe MD  04/15/22 2681       Leanna Briscoe MD  04/15/22 180

## 2022-10-23 ENCOUNTER — HEALTH MAINTENANCE LETTER (OUTPATIENT)
Age: 27
End: 2022-10-23

## 2023-02-27 ASSESSMENT — ANXIETY QUESTIONNAIRES
6. BECOMING EASILY ANNOYED OR IRRITABLE: NEARLY EVERY DAY
3. WORRYING TOO MUCH ABOUT DIFFERENT THINGS: NEARLY EVERY DAY
IF YOU CHECKED OFF ANY PROBLEMS ON THIS QUESTIONNAIRE, HOW DIFFICULT HAVE THESE PROBLEMS MADE IT FOR YOU TO DO YOUR WORK, TAKE CARE OF THINGS AT HOME, OR GET ALONG WITH OTHER PEOPLE: VERY DIFFICULT
4. TROUBLE RELAXING: NEARLY EVERY DAY
7. FEELING AFRAID AS IF SOMETHING AWFUL MIGHT HAPPEN: NEARLY EVERY DAY
GAD7 TOTAL SCORE: 21
GAD7 TOTAL SCORE: 21
1. FEELING NERVOUS, ANXIOUS, OR ON EDGE: NEARLY EVERY DAY
GAD7 TOTAL SCORE: 21
8. IF YOU CHECKED OFF ANY PROBLEMS, HOW DIFFICULT HAVE THESE MADE IT FOR YOU TO DO YOUR WORK, TAKE CARE OF THINGS AT HOME, OR GET ALONG WITH OTHER PEOPLE?: VERY DIFFICULT
7. FEELING AFRAID AS IF SOMETHING AWFUL MIGHT HAPPEN: NEARLY EVERY DAY
2. NOT BEING ABLE TO STOP OR CONTROL WORRYING: NEARLY EVERY DAY
5. BEING SO RESTLESS THAT IT IS HARD TO SIT STILL: NEARLY EVERY DAY

## 2023-02-27 ASSESSMENT — PATIENT HEALTH QUESTIONNAIRE - PHQ9
10. IF YOU CHECKED OFF ANY PROBLEMS, HOW DIFFICULT HAVE THESE PROBLEMS MADE IT FOR YOU TO DO YOUR WORK, TAKE CARE OF THINGS AT HOME, OR GET ALONG WITH OTHER PEOPLE: VERY DIFFICULT
SUM OF ALL RESPONSES TO PHQ QUESTIONS 1-9: 21
SUM OF ALL RESPONSES TO PHQ QUESTIONS 1-9: 21

## 2023-02-28 ENCOUNTER — OFFICE VISIT (OUTPATIENT)
Dept: INTERNAL MEDICINE | Facility: CLINIC | Age: 28
End: 2023-02-28
Payer: COMMERCIAL

## 2023-02-28 VITALS
DIASTOLIC BLOOD PRESSURE: 86 MMHG | OXYGEN SATURATION: 96 % | WEIGHT: 180.9 LBS | SYSTOLIC BLOOD PRESSURE: 138 MMHG | HEIGHT: 69 IN | BODY MASS INDEX: 26.79 KG/M2 | HEART RATE: 101 BPM | RESPIRATION RATE: 18 BRPM | TEMPERATURE: 98.2 F

## 2023-02-28 DIAGNOSIS — F33.2 SEVERE EPISODE OF RECURRENT MAJOR DEPRESSIVE DISORDER, WITHOUT PSYCHOTIC FEATURES (H): ICD-10-CM

## 2023-02-28 DIAGNOSIS — F41.9 ANXIETY: Primary | ICD-10-CM

## 2023-02-28 PROCEDURE — 99214 OFFICE O/P EST MOD 30 MIN: CPT | Performed by: INTERNAL MEDICINE

## 2023-02-28 ASSESSMENT — PATIENT HEALTH QUESTIONNAIRE - PHQ9
SUM OF ALL RESPONSES TO PHQ QUESTIONS 1-9: 21
10. IF YOU CHECKED OFF ANY PROBLEMS, HOW DIFFICULT HAVE THESE PROBLEMS MADE IT FOR YOU TO DO YOUR WORK, TAKE CARE OF THINGS AT HOME, OR GET ALONG WITH OTHER PEOPLE: VERY DIFFICULT

## 2023-02-28 ASSESSMENT — ANXIETY QUESTIONNAIRES: GAD7 TOTAL SCORE: 21

## 2023-02-28 ASSESSMENT — PAIN SCALES - GENERAL: PAINLEVEL: WORST PAIN (10)
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
## 2023-03-27 ASSESSMENT — ANXIETY QUESTIONNAIRES
2. NOT BEING ABLE TO STOP OR CONTROL WORRYING: NEARLY EVERY DAY
3. WORRYING TOO MUCH ABOUT DIFFERENT THINGS: NEARLY EVERY DAY
4. TROUBLE RELAXING: NEARLY EVERY DAY
GAD7 TOTAL SCORE: 21
7. FEELING AFRAID AS IF SOMETHING AWFUL MIGHT HAPPEN: NEARLY EVERY DAY
7. FEELING AFRAID AS IF SOMETHING AWFUL MIGHT HAPPEN: NEARLY EVERY DAY
IF YOU CHECKED OFF ANY PROBLEMS ON THIS QUESTIONNAIRE, HOW DIFFICULT HAVE THESE PROBLEMS MADE IT FOR YOU TO DO YOUR WORK, TAKE CARE OF THINGS AT HOME, OR GET ALONG WITH OTHER PEOPLE: EXTREMELY DIFFICULT
GAD7 TOTAL SCORE: 21
1. FEELING NERVOUS, ANXIOUS, OR ON EDGE: NEARLY EVERY DAY
GAD7 TOTAL SCORE: 21
6. BECOMING EASILY ANNOYED OR IRRITABLE: NEARLY EVERY DAY
8. IF YOU CHECKED OFF ANY PROBLEMS, HOW DIFFICULT HAVE THESE MADE IT FOR YOU TO DO YOUR WORK, TAKE CARE OF THINGS AT HOME, OR GET ALONG WITH OTHER PEOPLE?: EXTREMELY DIFFICULT
5. BEING SO RESTLESS THAT IT IS HARD TO SIT STILL: NEARLY EVERY DAY

## 2023-03-29 ENCOUNTER — VIRTUAL VISIT (OUTPATIENT)
Dept: PSYCHIATRY | Facility: CLINIC | Age: 28
End: 2023-03-29
Attending: INTERNAL MEDICINE
Payer: COMMERCIAL

## 2023-03-29 DIAGNOSIS — F41.9 ANXIETY: ICD-10-CM

## 2023-03-29 DIAGNOSIS — F33.2 SEVERE EPISODE OF RECURRENT MAJOR DEPRESSIVE DISORDER, WITHOUT PSYCHOTIC FEATURES (H): Primary | ICD-10-CM

## 2023-03-29 PROCEDURE — 99205 OFFICE O/P NEW HI 60 MIN: CPT | Mod: VID | Performed by: PSYCHIATRY & NEUROLOGY

## 2023-03-29 RX ORDER — DIVALPROEX SODIUM 500 MG/1
TABLET, EXTENDED RELEASE ORAL
Qty: 60 TABLET | Refills: 0 | Status: SHIPPED | OUTPATIENT
Start: 2023-03-29

## 2023-03-29 RX ORDER — PROPRANOLOL HYDROCHLORIDE 20 MG/1
TABLET ORAL
Qty: 45 TABLET | Refills: 0 | Status: SHIPPED | OUTPATIENT
Start: 2023-03-29

## 2023-03-29 ASSESSMENT — PATIENT HEALTH QUESTIONNAIRE - PHQ9
SUM OF ALL RESPONSES TO PHQ QUESTIONS 1-9: 26
SUM OF ALL RESPONSES TO PHQ QUESTIONS 1-9: 26
10. IF YOU CHECKED OFF ANY PROBLEMS, HOW DIFFICULT HAVE THESE PROBLEMS MADE IT FOR YOU TO DO YOUR WORK, TAKE CARE OF THINGS AT HOME, OR GET ALONG WITH OTHER PEOPLE: EXTREMELY DIFFICULT

## 2023-03-29 NOTE — NURSING NOTE
Surgery Daily Progress Note  Ken Talavera MD        10/21/2021 7:27 AM     Patient: Sandra Vargas   MRN: [id number]   : 1923     SUBJECTIVE  No acute events overnight.  Patient seen and examined.   No complaints of pain.  NGT actively draining pulled back a few inches this am  No BM, not passing gas, voiding normally     PHYSICAL EXAM  Vitals:  Blood pressure 124/68, pulse 77, temperature 97.9 °F (36.6 °C), temperature source Oral, resp. rate 16, height 4' 11\" (1.499 m), weight 73.4 kg (161 lb 12.8 oz), SpO2 99 %.    General: no acute distress  CV: regular rate  Resp: non labored breathing  GI: abdomen soft, no TTP, nondistended  Ext: warm and well perfused    INTAKE/OUTPUT  I/O last 3 completed shifts:  In: 2529.1 [I.V.:2529.1]  Out: 2250 [Drains:2250]  I/O this shift:  In: -   Out: 450 [Drains:450]     LABS  WBC (K/mcL)   Date Value   10/21/2021 9.4     RBC (mil/mcL)   Date Value   10/21/2021 3.88 (L)     HCT (%)   Date Value   10/21/2021 37.5     HGB (g/dL)   Date Value   10/21/2021 12.2     PLT (K/mcL)   Date Value   10/21/2021 154       Sodium (mmol/L)   Date Value   10/20/2021 143     Potassium (mmol/L)   Date Value   10/20/2021 4.2     Chloride (mmol/L)   Date Value   10/20/2021 107     Glucose (mg/dL)   Date Value   10/20/2021 110 (H)     Calcium (mg/dL)   Date Value   10/20/2021 8.8     Carbon Dioxide (mmol/L)   Date Value   10/20/2021 28     BUN (mg/dL)   Date Value   10/20/2021 17     Creatinine (mg/dL)   Date Value   10/20/2021 0.82       GOT/AST (Units/L)   Date Value   10/20/2021 26     GPT/ALT (Units/L)   Date Value   10/20/2021 17     No results found for: GGTP  Alkaline Phosphatase (Units/L)   Date Value   10/20/2021 193 (H)     Bilirubin, Total (mg/dL)   Date Value   10/20/2021 0.7       IMAGING  XR ABDOMEN 1 VIEW    Result Date: 10/20/2021  EXAM: XR ABDOMEN 1 VIEW CLINICAL INDICATION: Small bowel obstruction COMPARISON: None.     FINDINGS AND IMPRESSION:  Nasogastric tube seen  Is the patient currently in the state of [place]? YES    Visit mode:VIDEO    If the visit is dropped, the patient can be reconnected by: VIDEO VISIT: Text to cell phone:   Telephone Information:   Mobile [phone]       Will anyone else be joining the visit? No  (If patient encounters technical issues they should call [phone])    How would you like to obtain your AVS? MyChart    Are changes needed to the allergy or medication list? NO    Rooming Documentation: Patient will complete qnrs in mychart    Reason for visit:  Follow Up    TRISH Hernandez             with tip overlying the distal stomach.  Mildly dilated loops of small bowel noted compatible with clinical history. Contrast present in the nondilated colon.. Electronically Signed by: SARAHY TOMPKINS M.D. Signed on: 10/20/2021 5:54 PM        Assessment / Plan  Pt is a 89 y/o F with PMHx significant for A. fib on Eliquis, valve replacement, HTN, HLD, and recent ORIF who presented to the ED with nausea, vomiting, and abdominal pain that started yesterday.  Surgery consulted for SBO in the setting of abdominal pain, bilious emesis and imaging consistent with dilated loops of bowel and transition point.    Day 2 of conservative management for SBO     Plan  - NPO  - IVF  - Continue conservative management and await return of bowel function  - Serial abdominal examination  - NGT monitoring; pulled back 2-3 inches  - Pain control, nausea control  - SCDs for DVT ppx    Will discuss plan with attending, Dr. Sam Farnsworth MS3      I have seen and examined this patient with the residents and fellow and agree with the above note.      Did well overnight  Pain is 0/10 currently    Visit Vitals  BP (!) 144/87 (BP Location: LUE - Left upper extremity, Patient Position: Sitting)   Pulse 74   Temp 97.3 °F (36.3 °C) (Oral)   Resp 15   Ht 4' 11\" (1.499 m)   Wt 73.4 kg (161 lb 12.8 oz)   SpO2 99%   BMI 32.68 kg/m²     VSS  NAD  Awake, alert, appropriate  No scleral icterus  Abdomen is softer and less distended; tenderness is improved  No incisional scars  BLE symmetric without swelling    I personally viewed patient's KUB which demonstrates mildly dilated bowel (3.6cm) as well as air in colon and rectum.  Contrast appears to have passed through small bowel.        SBO - improving both symptomatically as well as on imaging and labs.  NGT remains high although NGT may be in duodenum.  Will pull back.  Continue NPO, NGT, serial exams.     35 minutes of care provided      Julio Vargas MD
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
## 2023-03-29 NOTE — PATIENT INSTRUCTIONS
"Patient Education   Collaborative Care Psychiatry Service  What to Expect  Here's what to expect from your Collaborative Care Psychiatry Service (CCPS).   About CCPS  CCPS means 2 people work together to help you get better. You'll meet with a behavioral health clinician and a psychiatric doctor. A behavioral health clinician helps people with mental health problems by talking with them. A psychiatric doctor helps people by giving them medicine.  How it works  At every visit, you'll see the behavioral health clinician (BHC) first. They'll talk with you about how you're doing and teach you how to feel better.   Then you'll see the psychiatric doctor. This doctor can help you deal with troubling thoughts and feelings by giving you medicine. They'll make sure you know the plan for your care.   CCPS usually takes 3 to 6 visits. If you need more visits, we may have you start seeing a different psychiatric doctor for ongoing care.  If you have any questions or concerns, we'll be glad to talk with you.  About visits  Be open  At your visits, please talk openly about your problems. It may feel hard, but it's the best way for us to help you.  Cancelling visits  If you can't come to your visit, please call us right away at [phone]. If you don't cancel at least 24 hours (1 full day) before your visit, that's \"late cancellation.\"  Being late to visits  Being very late is the same as not showing up. You will be a \"no show\" if:  Your appointment starts with a BHC, and you're more than 15 minutes late for a 30-minute (half hour) visit. This will also cancel your appointment with the psychiatric doctor.  Your appointment is with a psychiatric doctor only, and you're more than 15 minutes late for a 30-minute (half hour) visit.  Your appointment is with a psychiatric doctor only, and you're more than 30 minutes late for a 60-minute (full hour) visit.  If you cancel late or don't show up 2 times within 6 months, we may end your " care.   Getting help between visits  If you need help between visits, you can call us Monday to Friday from 8 a.m. to 4:30 p.m. at [phone].  Emergency care  Call 911 or go to the nearest emergency department if your life or someone else's life is in danger.  Call 988 anytime to reach the national Suicide and Crisis hotline.  Medicine refills  To refill your medicine, call your pharmacy. You can also call [facility]'s Behavioral Access at [phone], Monday to Friday, 8 a.m. to 4:30 p.m. It can take 1 to 3 business days to get a refill.   Forms, letters, and tests  You may have papers to fill out, like FMLA, short-term disability, and workability. We can help you with these forms at your visits, but you must have an appointment. You may need more than 1 visit for this, to be in an intensive therapy program, or both.  Before we can give you medicine for ADHD, we may refer you to get tested for it or confirm it another way.  We may not be able to give you an emotional support animal letter.  We don't do mental health checks ordered by the court.   We don't do mental health testing, but we can refer you to get tested.   Thank you for choosing us for your care.  For informational purposes only. Not to replace the advice of your health care provider. Copyright   2022 [facility]. All rights reserved. [employer] [id number] - 12/22.

## 2023-03-29 NOTE — Clinical Note
Dr. Denson,  Thank you for your consult and care of the patient.  Starting Depakote to address mood stabilization.  Also started on propranolol.  This patient is better off establishing care with a permanent psychiatrist, whereas I provide CCPS consultation. Referring to establish care.  He was referred to the partial program.  Sincerely, Osmar Ortiz M.D. Consultative Psychiatrist Program Medical Director, Lead Collaborative Care Psychiatry Service

## 2023-04-02 ENCOUNTER — HEALTH MAINTENANCE LETTER (OUTPATIENT)
Age: 28
End: 2023-04-02

## 2023-06-13 ASSESSMENT — ANXIETY QUESTIONNAIRES
4. TROUBLE RELAXING: NEARLY EVERY DAY
GAD7 TOTAL SCORE: 20
7. FEELING AFRAID AS IF SOMETHING AWFUL MIGHT HAPPEN: NEARLY EVERY DAY
GAD7 TOTAL SCORE: 20
3. WORRYING TOO MUCH ABOUT DIFFERENT THINGS: NEARLY EVERY DAY
6. BECOMING EASILY ANNOYED OR IRRITABLE: NEARLY EVERY DAY
2. NOT BEING ABLE TO STOP OR CONTROL WORRYING: NEARLY EVERY DAY
8. IF YOU CHECKED OFF ANY PROBLEMS, HOW DIFFICULT HAVE THESE MADE IT FOR YOU TO DO YOUR WORK, TAKE CARE OF THINGS AT HOME, OR GET ALONG WITH OTHER PEOPLE?: VERY DIFFICULT
7. FEELING AFRAID AS IF SOMETHING AWFUL MIGHT HAPPEN: NEARLY EVERY DAY
5. BEING SO RESTLESS THAT IT IS HARD TO SIT STILL: MORE THAN HALF THE DAYS
1. FEELING NERVOUS, ANXIOUS, OR ON EDGE: NEARLY EVERY DAY
IF YOU CHECKED OFF ANY PROBLEMS ON THIS QUESTIONNAIRE, HOW DIFFICULT HAVE THESE PROBLEMS MADE IT FOR YOU TO DO YOUR WORK, TAKE CARE OF THINGS AT HOME, OR GET ALONG WITH OTHER PEOPLE: VERY DIFFICULT
GAD7 TOTAL SCORE: 20

## 2023-06-15 ENCOUNTER — VIRTUAL VISIT (OUTPATIENT)
Dept: PSYCHOLOGY | Facility: CLINIC | Age: 28
End: 2023-06-15
Payer: COMMERCIAL

## 2023-06-15 DIAGNOSIS — F41.9 ANXIETY: ICD-10-CM

## 2023-06-15 DIAGNOSIS — F33.2 SEVERE EPISODE OF RECURRENT MAJOR DEPRESSIVE DISORDER, WITHOUT PSYCHOTIC FEATURES (H): ICD-10-CM

## 2023-06-15 PROCEDURE — 90837 PSYTX W PT 60 MINUTES: CPT | Mod: VID

## 2023-06-15 ASSESSMENT — PATIENT HEALTH QUESTIONNAIRE - PHQ9
SUM OF ALL RESPONSES TO PHQ QUESTIONS 1-9: 23
SUM OF ALL RESPONSES TO PHQ QUESTIONS 1-9: 23
10. IF YOU CHECKED OFF ANY PROBLEMS, HOW DIFFICULT HAVE THESE PROBLEMS MADE IT FOR YOU TO DO YOUR WORK, TAKE CARE OF THINGS AT HOME, OR GET ALONG WITH OTHER PEOPLE: EXTREMELY DIFFICULT

## 2023-06-15 NOTE — PROGRESS NOTES
[facility]   Mental Health & Addiction Services     Progress Note - Initial Visit    Patient  Name:  Favio Will Date: 6/15/23           Service Type: Individual     Visit Start Time: 1:30 pm  Visit End Time: 2:30 pm    Visit #: 1    Attendees: Client attended alone    Service Modality:  Video Visit:      Provider verified identity through the following two step process.  Patient provided:  Patient  and Patient address    Telemedicine Visit: The patient's condition can be safely assessed and treated via synchronous audio and visual telemedicine encounter.      Reason for Telemedicine Visit: Patient has requested telehealth visit    Originating Site (Patient Location): Patient's home    Distant Site (Provider Location): Provider Remote Setting- Home Office    Consent:  The patient/guardian has verbally consented to: the potential risks and benefits of telemedicine (video visit) versus in person care; bill my insurance or make self-payment for services provided; and responsibility for payment of non-covered services.     Patient would like the video invitation sent by:  My Chart    Mode of Communication:  Video Conference via Amwell    Distant Location (Provider):  Off-site    As the provider I attest to compliance with applicable laws and regulations related to telemedicine.       DATA:  Extended Session (53+ minutes):   - Patient's presenting concerns require more intensive intervention than could be completed within the usual service  Interactive Complexity: -The need to manage maladaptive communication (related to, e.g., high anxiety, high reactivity, repeated questions, or disagreement) among participants that complicates delivery of care  Crisis: No     Presenting Concerns/  Current Stressors:   Patient reports severe depression, anxiety, endorses suicidal ideation - without specific plan. Patient's mother completed the PHQ and patient said he sat next to her. During the appointment  patient stated he has SI, but no plan or intent. Patient denied the PHQ answers as being accurate.   Patient had a recent relationship loss.       ASSESSMENT:  Mental Status Assessment:  Appearance:   Appropriate   Eye Contact:   Good   Psychomotor Behavior: Normal   Attitude:   Cooperative   Orientation:   All  Speech   Rate / Production: Talkative   Volume:  Normal   Mood:    Anxious  Irritable  Sad   Affect:    Appropriate   Thought Content:  Clear   Thought Form:  Logical   Insight:    Good       Safety Issues and Plan for Safety and Risk Management:   [place] Suicide Severity Rating Scale (Lifetime/Recent)      7/4/2011    10:09 PM 8/16/2011    11:57 PM 6/15/2023     1:42 PM   [place] Suicide Severity Rating (Lifetime/Recent)   Do you have guns available to you? No No    1. Wish to be Dead (Lifetime)   Y   Wish to be Dead Description (Lifetime)   2018, felt trapped, depressed, relationship break-up,   1. Wish to be Dead (Past 1 Month)   Y   2. Non-Specific Active Suicidal Thoughts (Lifetime)   Y   2. Non-Specific Active Suicidal Thoughts (Past 1 Month)   Y   Non-Specific Active Suicidal Thought Description (Past 1 Month)   Passive thoughts.   3. Active Suicidal Ideation with any Methods (Not Plan) Without Intent to Act (Lifetime)   Y   Active Suicidal Ideation with any Methods (Not Plan) Description (Lifetime)   Sometimes has ideas of pills.   3. Active Suicidal Ideation with any Methods (Not Plan) Without Intent to Act (Past 1 Month)   Y   Active Suicidal Ideation with any Methods (Not Plan) Description (Past 1 Month)   Yes, passive thoughts. No active plan, not planning.   4. Active Suicidal Ideation with Some Intent to Act, Without Specific Plan (Lifetime)   N   4. Active Suicidal Ideation with Some Intent to Act, Without Specific Plan (Past 1 Month)   N   5. Active Suicidal Ideation with Specific Plan and Intent (Lifetime)   N   5. Active Suicidal Ideation with Specific Plan and Intent (Past 1 Month)   N  "  Most Severe Ideation Rating (Lifetime)   5   Description of Most Severe Ideation (Lifetime)   \"Feeling depressed, stuck.\"   Most Severe Ideation Rating (Past 1 Month)   5   Description of Most Severe Ideation (Past 1 Month)   \"Shitty feeling, worst feeling in the world.\"   Frequency (Lifetime)   3   Frequency (Past 1 Month)   3   Duration (Lifetime)   1   Duration (Past 1 Month)   1   Controllability (Lifetime)   0   Controllability (Past 1 Month)   0   Deterrents (Lifetime)   1   Deterrents (Past 1 Month)   1   Reasons for Ideation (Lifetime)   5   Reasons for Ideation (Past 1 Month)   5   Actual Attempt (Lifetime)   N   Has subject engaged in non-suicidal self-injurious behavior? (Lifetime)   N   Interrupted Attempts (Lifetime)   N   Aborted or Self-Interrupted Attempt (Lifetime)   N   Preparatory Acts or Behavior (Lifetime)   N   Calculated C-SSRS Risk Score (Lifetime/Recent)   Moderate Risk     Patient denies current fears or concerns for personal safety.  Patient reports the following current or recent suicidal ideation or behaviors: passive suicidal thoughts, no plan or intent.  Patient denies current or recent homicidal ideation or behaviors.  Patient denies current or recent self injurious behavior or ideation.  Patient denies other safety concerns.  Recommended that patient call 911 or go to the local ED should there be a change in any of these risk factors.  Patient reports there are no firearms in the house.     Diagnostic Criteria:  Major Depressive Disorder  A) Recurrent episode(s) - symptoms have been present during the same 2-week period and represent a change from previous functioning 5 or more symptoms (required for diagnosis)   - Depressed mood. Note: In children and adolescents, can be irritable mood.     - Diminished interest or pleasure in all, or almost all, activities.   - Decreased sleep.     - Fatigue or loss of energy.    - Feelings of worthlessness    - Diminished ability to think or " concentrate, or indecisiveness.    - Recurrent thoughts of death (not just fear of dying), recurrent suicidal ideation without a specific plan, or a suicide attempt or a specific plan for committing suicide.   B) The symptoms cause clinically significant distress or impairment in social, occupational, or other important areas of functioning  C) The episode is not attributable to the physiological effects of a substance or to another medical condition  D) The occurence of major depressive episode is not better explained by other thought / psychotic disorders  E) There has never been a manic episode or hypomanic episode    DSM5 Diagnoses: (Sustained by DSM5 Criteria Listed Above)  Diagnoses: 296.33 (F33.2) Major Depressive Disorder, Recurrent Episode, Severe _  Psychosocial & Contextual Factors: Patient reports a history of depression and anxiety.  Intervention:   Writer completed columbia with patient and created a safety time. Patient agreed to contract for safety, identified feeling hopeless regarding symptoms not reducing. Writer used encouragement, validation, and reflective listening skills to help increase hope. Patient reports his mother set up the appointment.  Collateral Reports Completed:  Writer requested to speak with patient's parents, patient declined.      PLAN: (Homework, other):  1. Provider will continue Diagnostic Assessment.  Patient was given the following to do until next session:  Patient will continue to take his medications, as prescribed.    2. Provider recommended the following referrals: patient requested information about ketamine treatments. Writer sent him information via Reaction.    3.  Suicide Risk and Safety Concerns were assessed for Favio Will.      Moderate Risk is identified when the patient has one of the following:  Suicidal Ideation with method (The How) Without plan, intent, or behavior in the past month (Yes to C-SSRS Ideation #3)     The following has been  recommended:  Complete/Review/Update Safety Plan    Safety Plan:  Adult Short Safety Plan:   Name: Favio Will  YOB: 1995  Date: Lisa 15, 2023   My primary care provider:    Javi Denson MD  Internal Medicine      My primary care clinic: [facility]  My prescriber: Leandra Daniels  Other care team support:  Elvi Mejía - counselor   My Triggers:  Relationship conflict - ex partner     Additional People, Places, and Things that I can access for support:   My dad.  Emergency contact: Georgina Will - Biological dad [phone]     What is important to me and makes life worth living: Parents and dog.          GREEN    Good Control  1. I feel good  2. No suicidal thoughts   3. Can work, sleep and play      Action Steps  1. Self-care: balanced meals, exercising, sleep practices, etc.  2. Take your medications as prescribed.  3. Continue meetings with therapist and prescriber.  4.  Do the healthy things that I enjoy.           YELLOW  Getting Worse  I have ANY of these:  1. I do not feel good  2. Difficulty Concentrating  3. Sleep is changing  4. Increase/Change in my thoughts to hurt self and/or others, but I can still manage and not act on it.   5. Not taking care of self.             Action Steps (in addition to the above):  1. Inform your therapist and psychiatric prescriber/PCP.  2. Keep taking your medications as prescribed.    3. Turn to people you can ask for help.  4. Use internal coping strategies -see below.  5. Create safe environment: lock and limit medications             RED  Get Help  If I have ANY of these:  1. Current and uncontrollable thoughts and/or behaviors to hurt self and/or others.   2.    Actions to manage my safety  1. Contact your emergency person: my dad  2. Call or Text [phone]  3. Call my crisis team- [facility] [phone]  3. Or Call 911 or go to the emergency room right away        My Internal Coping Strategies include the following:  Go on [employer] and watching  Youtube, and music.         Elvi Mejía  Lisa 15, 2023  Note reviewed and clinical supervision by JOVAN Hooks, [facility] 6/16/2023

## 2023-07-06 ASSESSMENT — PATIENT HEALTH QUESTIONNAIRE - PHQ9
SUM OF ALL RESPONSES TO PHQ QUESTIONS 1-9: 25
10. IF YOU CHECKED OFF ANY PROBLEMS, HOW DIFFICULT HAVE THESE PROBLEMS MADE IT FOR YOU TO DO YOUR WORK, TAKE CARE OF THINGS AT HOME, OR GET ALONG WITH OTHER PEOPLE: EXTREMELY DIFFICULT
SUM OF ALL RESPONSES TO PHQ QUESTIONS 1-9: 25

## 2023-07-07 ENCOUNTER — TELEPHONE (OUTPATIENT)
Dept: PSYCHOLOGY | Facility: CLINIC | Age: 28
End: 2023-07-07
Payer: COMMERCIAL

## 2023-07-07 ENCOUNTER — VIRTUAL VISIT (OUTPATIENT)
Dept: PSYCHOLOGY | Facility: CLINIC | Age: 28
End: 2023-07-07
Payer: COMMERCIAL

## 2023-07-07 DIAGNOSIS — F33.2 SEVERE EPISODE OF RECURRENT MAJOR DEPRESSIVE DISORDER, WITHOUT PSYCHOTIC FEATURES (H): Primary | ICD-10-CM

## 2023-07-07 PROCEDURE — 90837 PSYTX W PT 60 MINUTES: CPT | Mod: VID

## 2023-07-07 NOTE — PROGRESS NOTES
[facility]                                     Progress Note    Patient Name: Favio Will  Date: 7/07/23           Service Type: Individual      Session Start Time:  3:00 pm  Session End Time: 4:00 pm     Session Length: 60 minutes    Session #: 2    Attendees: Patient attended alone, and then his mother attended with patient    Service Modality:  Video Visit:      Provider verified identity through the following two step process.  Patient provided:  Patient is known previously to provider    Telemedicine Visit: The patient's condition can be safely assessed and treated via synchronous audio and visual telemedicine encounter.      Reason for Telemedicine Visit: Patient has requested telehealth visit    Originating Site (Patient Location): Patient's home    Distant Site (Provider Location): [facility] [place] [facility]    Consent:  The patient/guardian has verbally consented to: the potential risks and benefits of telemedicine (video visit) versus in person care; bill my insurance or make self-payment for services provided; and responsibility for payment of non-covered services.     Patient would like the video invitation sent by:  My Chart, and if TabletKiosk does not work to send via text to cell phone.    Mode of Communication:  Video Conference via Amwell    Distant Location (Provider):  Off-site    As the provider I attest to compliance with applicable laws and regulations related to telemedicine.    DATA  Extended Session (53+ minutes):   - Patient's presenting concerns require more intensive intervention than could be completed within the usual service  Interactive Complexity: Yes, visit entailed Interactive Complexity evidenced by:  -The need to manage maladaptive communication (related to, e.g., high anxiety, high reactivity, repeated questions, or disagreement) among participants that complicates delivery of care  Crisis: No     Progress Since Last  Session (Related to Symptoms / Goals / Homework):   Symptoms: Worsening - patient reports severe suicidal ideation while taking seroquel. Patient stated he stopped taking seroquel and has returned to his baseline regarding passive suicidal ideation  Patient stated it has been approximately 10 days since he stopped taking Seroquel.    Homework: Achieved / completed to satisfaction      Episode of Care Goals: No improvement - CONTEMPLATION (Considering change and yet undecided); Intervened by assessing the negative and positive thinking (ambivalence) about behavior change     Current / Ongoing Stressors and Concerns:   History of depression, anxiety, current SI passive thoughts - denies plan and denies intent, emotional dysregulation, loss of a romantic relationship      Treatment Objective(s) Addressed in This Session:   Decrease thoughts that you'd be better off dead or of suicide / self-harm       Intervention:   Therapist reviewed safety plan with patient, patient agreed again, and agreed to tell his mother if he is feeling suicidal, has intent, and plans to act, and she will bring him to the hospital or call 911 if needed. His mother agreed. Patient processed his frustrations regarding his depression and fear of trying Lexapro, due to his most recent trial with Seroquel (patient reported it made his SI worse and more severe and stopped taking it approximately 10 days ago, and symptoms have returned to his baseline of passive SI). Other treatment options was also discussed. Writer recommended PHP, patient would like more time to think about it and would like more information. Writer used psycho-education, validation, encouragement, reflective listening to increase positive change talk. Patient is in the contemplation stage of change.    Assessments completed prior to visit:  The following assessments were completed by patient for this visit:  PHQ9:       2/7/2022    12:33 PM 2/27/2023     3:02 PM 3/29/2023      2:47 PM 6/15/2023    10:36 AM 7/6/2023    10:59 AM   PHQ-9 SCORE   PHQ-9 Total Score MyChart 25 (Severe depression) 21 (Severe depression) 26 (Severe depression) 23 (Severe depression) 25 (Severe depression)   PHQ-9 Total Score 25 21 26 23 25         ASSESSMENT: Current Emotional / Mental Status (status of significant symptoms):   Risk status (Self / Other harm or suicidal ideation)   Patient denies current fears or concerns for personal safety.   Patient reports the following current or recent suicidal ideation or behaviors: severe SI while taking Seroquel, stopped Seroquel approximately 10 days ago, and reports his symptoms are back to baseline - passive SI with no intent and no plan.   Patient denies current or recent homicidal ideation or behaviors.   Patient denies current or recent self injurious behavior or ideation.   Patient denies other safety concerns.   Patient reports there has been no change in risk factors since their last session.     Patient reports there has been no change in protective factors since their last session.     Recommended that patient call 911 or go to the local ED should there be a change in any of these risk factors.     Appearance:   Appropriate    Eye Contact:   Good    Psychomotor Behavior: Normal    Attitude:   Cooperative    Orientation:   All   Speech    Rate / Production: Talkative    Volume:  Normal    Mood:    Anxious  Depressed  Sad    Affect:    Appropriate    Thought Content:  Clear    Thought Form:  Coherent  Logical    Insight:    Good      Medication Review:   Changes to psychiatric medications, see updated Medication List in EPIC.    Patient reports no longer taking Seroquel. Patient is contemplating Lexapro.     Medication Compliance:   No     Changes in Health Issues:   None reported     Chemical Use Review:   Substance Use: Chemical use reviewed, further assessment is needed. Patient reports cannabis helps his anxiety.     Tobacco Use: No change in tobacco  use.    Diagnosis:  1. Severe episode of recurrent major depressive disorder, without psychotic features (H)        Collateral Reports Completed:   Routed note to most recent psychiatrist at [facility] - Dr. Ortiz, to notify patient no longer taking Seroquel. Will request MELO from patient at next meeting to communicate with patient's new psychiatrist from outside of [place].    PLAN: (Patient Tasks / Therapist Tasks / Other)  Patient will further explore his treatment options. Writer will send patient information regarding [facility]'s PHP program. Next meeting is scheduled for Friday, 1:30 pm.        Elvi Mejía  7/7/23  Note reviewed and clinical supervision by Ruth Toth, JOVAN, [facility] 7/11/2023                                                            The following has been recommended:  Complete/Review/Update Safety Plan    Safety Plan:  Adult Short Safety Plan:   Name: Favio Will  YOB: 1995  Date: Lisa 15, 2023   My primary care provider:    Javi Denson MD  Internal Medicine      My primary care clinic: [place]  My prescriber: Leandra Daniels  Other care team support:  Elvi Mejía - counselor   My Triggers:  Relationship conflict - ex partner     Additional People, Places, and Things that I can access for support:   My dad, Mom  Emergency contact: Georgina Will - Biological dad [phone]     What is important to me and makes life worth living: Parents and dog.          GREEN    Good Control  1. I feel good  2. No suicidal thoughts   3. Can work, sleep and play      Action Steps  1. Self-care: balanced meals, exercising, sleep practices, etc.  2. Take your medications as prescribed.  3. Continue meetings with therapist and prescriber.  4.  Do the healthy things that I enjoy.           YELLOW  Getting Worse  I have ANY of these:  1. I do not feel good  2. Difficulty Concentrating  3. Sleep is changing  4. Increase/Change in my thoughts to hurt self and/or others,  but I can still manage and not act on it.   5. Not taking care of self.             Action Steps (in addition to the above):  1. Inform your therapist and psychiatric prescriber/PCP.  2. Keep taking your medications as prescribed.    3. Turn to people you can ask for help.  4. Use internal coping strategies -see below.  5. Create safe environment: lock and limit medications             RED  Get Help  If I have ANY of these:  1. Current and uncontrollable thoughts and/or behaviors to hurt self and/or others.      Actions to manage my safety  1. Contact your emergency person: my dad/my mom  2. Call or Text [phone]  3. Call my crisis team- [facility] [phone]  3. Or Call 911 or go to the emergency room right away        My Internal Coping Strategies include the following:  Go on Youtube and watching Youtube, and music.         Elvi Mejía  7/7/23

## 2023-07-07 NOTE — TELEPHONE ENCOUNTER
Writer consulted with manager. Emergency, because patient answered the PHQ stating he has SI and intent to self-harm.    Patient did not show for virtual appointment (check in showed he checked in and left), writer called patient and left two voice messages. Patient stated in PHQ he has suicidal ideation and intent, and if patient does not enter the virtual room by 3:00 pm. Writer will contact the police to conduct a welfare check.  Writer attempted to contact patient's mother: Marta Quickmary [phone], received voicemail, writer did not leave a message.     Writer contacted patient's stepfather: Alexi Meg [phone]    Was able to contact Alexi Weaver, and speak with the patient. Patient stated he did not complete the PHQ, he denied intent and plan. Endorses passive SI. Safety plan was reviewed, and patient agreed.    Writer did not have to call the police to complete a welfare check.    Please see note for the virtual session for 7/7/23.      Elvi Mejía MA, LPC  7/7/23

## 2023-07-14 ENCOUNTER — VIRTUAL VISIT (OUTPATIENT)
Dept: PSYCHOLOGY | Facility: CLINIC | Age: 28
End: 2023-07-14
Payer: COMMERCIAL

## 2023-07-14 DIAGNOSIS — F33.2 SEVERE EPISODE OF RECURRENT MAJOR DEPRESSIVE DISORDER, WITHOUT PSYCHOTIC FEATURES (H): Primary | ICD-10-CM

## 2023-07-14 DIAGNOSIS — F41.1 GAD (GENERALIZED ANXIETY DISORDER): ICD-10-CM

## 2023-07-14 PROCEDURE — 90791 PSYCH DIAGNOSTIC EVALUATION: CPT | Mod: 95

## 2023-07-14 ASSESSMENT — PATIENT HEALTH QUESTIONNAIRE - PHQ9
SUM OF ALL RESPONSES TO PHQ QUESTIONS 1-9: 24
10. IF YOU CHECKED OFF ANY PROBLEMS, HOW DIFFICULT HAVE THESE PROBLEMS MADE IT FOR YOU TO DO YOUR WORK, TAKE CARE OF THINGS AT HOME, OR GET ALONG WITH OTHER PEOPLE: EXTREMELY DIFFICULT
SUM OF ALL RESPONSES TO PHQ QUESTIONS 1-9: 24

## 2023-07-14 NOTE — PROGRESS NOTES
"        [facility]      PATIENT'S NAME: Favio Will  PREFERRED NAME: Favio  PRONOUNS: he/him/his     MRN: [id number]  : 1995  ADDRESS[facility] [address] [address]  [facility]  [id number]  DATE OF SERVICE: 23  START TIME: 1:30 pm  END TIME: 2:30 pm  PREFERRED PHONE: [phone]  May we leave a program related message: Yes  SERVICE MODALITY:  Video Visit:      Provider verified identity through the following two step process.  Patient provided:  Patient is known previously to provider    Telemedicine Visit: The patient's condition can be safely assessed and treated via synchronous audio and visual telemedicine encounter.      Reason for Telemedicine Visit: Patient has requested telehealth visit    Originating Site (Patient Location): Patient's home    Distant Site (Provider Location): [facility]    Consent:  The patient/guardian has verbally consented to: the potential risks and benefits of telemedicine (video visit) versus in person care; bill my insurance or make self-payment for services provided; and responsibility for payment of non-covered services.     Patient would like the video invitation sent by:  My Chart, if patient is not able to log in via [employer], send text to patient's cell phone: [phone]     Mode of Communication:  Video Conference via [facility]    Distant Location (Provider):  Off-site    As the provider I attest to compliance with applicable laws and regulations related to telemedicine.    UNIVERSAL ADULT Mental Health DIAGNOSTIC ASSESSMENT    Identifying Information:  Patient is a 28 year old,   individual.  Patient was referred for an assessment by primary care provider.  Patient attended the session alone.    Chief Complaint:   The reason for seeking services at this time is: \"Anxiety, Depression, Suicidal thoughts, ADHD, OCD, Sexual issues\".  The problem(s) began since " childhood. Patient has history of anxiety, depression, polysubstance abuse, dual diagnosed IP treatment at [facility] when patient was 16, that included a stay of commitment, and borderline intellectual functioning.  Patient reports difficulties with rage, depression, anxiety, persistent SI thoughts (currently passive, but recently when he took Seroquel he had severe SI thoughts with plan).   Oppositional defiant diagnosis was a teenager.     Patient has attempted to resolve these concerns in the past through counseling, treatment, and medication management.     Social/Family History:  Patient reported they grew up in [place], [place].  They were raised by biological mother; stepfather  .  Parents  / .  Patient reported that their childhood was difficult.  Patient described their current relationships with family of origin as good.    The patient describes their cultural background as . Cultural influences and impact on patient's life structure, values, norms, and healthcare: history of drug use and treatment. Contextual influences on patient's health include: Contextual Factors: Individual Factors - borderline intellectual functioning.  These factors will be addressed in the Preliminary Treatment plan. Patient identified their preferred language to be English. Patient reported they does not need the assistance of an  or other support involved in therapy.     Patient reported he was in special education in school. According chart review, patient was diagnosed with borderline intellectual functioning, as a youth. Specific developmental delays is unknown (was not able to assess due to patient's symptoms interferring during the interview). Patient's highest education level was high school graduate. Patient identified the following learning problems: concentration and (patient had IEP, but could not identify learning difficulties beyond concentration. Modifications will  not be used to assist communication in therapy. Patient reports they are  able to understand written materials.    Patient reported the following relationship history: never .  Patient's current relationship status is single. Patient identified their sexual orientation as heterosexual.  Patient reported having no child(citlalli). Patient identified parents; pets as part of their support system.  Patient identified the quality of these relationships as fair.    Patient's current living/housing situation involves living with his parents. The immediate members of family and household include Bandar Weaver, 62,Biological Mother, Alexi, Step Father, and they report that housing is stable.    Patient is currently unemployed.  Patient reports their finances are obtained through parents. Patient does identify finances as a current stressor.      Patient reported that they have not been involved with the legal system. Per chart review, patient was on a stay of commitment when patient was 16 and attending the dual diagnosis inpatient program at [facility]. Patient does not report being under probation/ parole/ jurisdiction. They are not under any current court jurisdiction. .    Patient's Strengths and Limitations:  Patient identified the following strengths or resources that will help them succeed in treatment: family support. Things that may interfere with the patient's success in treatment include: lack of social support.     Assessments:  The following assessments were completed by patient for this visit:  PHQ9:       2/7/2022    12:33 PM 2/27/2023     3:02 PM 3/29/2023     2:47 PM 6/15/2023    10:36 AM 7/6/2023    10:59 AM 7/14/2023     8:07 AM   PHQ-9 SCORE   PHQ-9 Total Score MyChart 25 (Severe depression) 21 (Severe depression) 26 (Severe depression) 23 (Severe depression) 25 (Severe depression) 24 (Severe depression)   PHQ-9 Total Score 25 21 26 23 25 24     GAD7:       2/27/2023     3:07 PM 3/27/2023      4:03 PM 6/13/2023     9:09 AM   RENATA-7 SCORE   Total Score 21 (severe anxiety) 21 (severe anxiety) 20 (severe anxiety)   Total Score 21 21 20     CAGE-AID:       3/27/2023     4:07 PM   CAGE-AID Total Score   Total Score 4   Total Score MyChart 4 (A total score of 2 or greater is considered clinically significant)     PROMIS 10-Global Health (only subscores and total score):       3/27/2023     4:06 PM 6/15/2023    10:37 AM   PROMIS-10 Scores Only   Global Mental Health Score 4 4   Global Physical Health Score 6 7   PROMIS TOTAL - SUBSCORES 10 11     Bailey Suicide Severity Rating Scale (Lifetime/Recent)      7/4/2011    10:09 PM 8/16/2011    11:57 PM 6/15/2023     1:42 PM   Bailey Suicide Severity Rating (Lifetime/Recent)   Do you have guns available to you? No No    Q1 Wish to be Dead (Lifetime)   Y   Wish to be Dead Description (Lifetime)   2018, felt trapped, depressed, relationship break-up,   1. Wish to be Dead (Past 1 Month)   Y   Q2 Non-Specific Active Suicidal Thoughts (Lifetime)   Y   2. Non-Specific Active Suicidal Thoughts (Past 1 Month)   Y   Non-Specific Active Suicidal Thought Description (Past 1 Month)   Passive thoughts.   3. Active Suicidal Ideation with any Methods (Not Plan) Without Intent to Act (Lifetime)   Y   Active Suicidal Ideation with any Methods (Not Plan) Description (Lifetime)   Sometimes has ideas of pills.   Q3 Active Suicidal Ideation with any Methods (Not Plan) Without Intent to Act (Past 1 Month)   Y   Active Suicidal Ideation with any Methods (Not Plan) Description (Past 1 Month)   Yes, passive thoughts. No active plan, not planning.   Q4 Active Suicidal Ideation with Some Intent to Act, Without Specific Plan (Lifetime)   N   4. Active Suicidal Ideation with Some Intent to Act, Without Specific Plan (Past 1 Month)   N   Q5 Active Suicidal Ideation with Specific Plan and Intent (Lifetime)   N   5. Active Suicidal Ideation with Specific Plan and Intent (Past 1 Month)   N   Most  "Severe Ideation Rating (Lifetime)   5   Description of Most Severe Ideation (Lifetime)   \"Feeling depressed, stuck.\"   Most Severe Ideation Rating (Past 1 Month)   5   Description of Most Severe Ideation (Past 1 Month)   \"Shitty feeling, worst feeling in the world.\"   Frequency (Lifetime)   3   Frequency (Past 1 Month)   3   Duration (Lifetime)   1   Duration (Past 1 Month)   1   Controllability (Lifetime)   0   Controllability (Past 1 Month)   0   Deterrents (Lifetime)   1   Deterrents (Past 1 Month)   1   Reasons for Ideation (Lifetime)   5   Reasons for Ideation (Past 1 Month)   5   Actual Attempt (Lifetime)   N   Has subject engaged in non-suicidal self-injurious behavior? (Lifetime)   N   Interrupted Attempts (Lifetime)   N   Aborted or Self-Interrupted Attempt (Lifetime)   N   Preparatory Acts or Behavior (Lifetime)   N   Calculated C-SSRS Risk Score (Lifetime/Recent)   Moderate Risk       Personal and Family Medical History:  Patient does report a family history of mental health concerns.  Patient reports family history is not on file. Family history includes depression, and substance abuse.    Patient does report Mental Health Diagnosis and/or Treatment.  Patient Patient reported the following previous diagnoses which include(s): ADHD, anxiety disorder, depression, OCD, ODD, polysubstance abuse, opioid abuse, benzo abuse. Patient reported symptoms began in childhood.   Patient has received mental health services in the past: counseling, medication management, dual diagnosis treatment at [place] when patient was 16.  Psychiatric Hospitalizations: [facility], dual diagnosis IP.  Patient patient had a stay of commitment at age 16.  Patient is receiving other mental health services.  These include counseling with writer, and medication management with RASHEED Carrion, at [facility] in [place].    Patient has had a physical exam to rule out medical causes for current symptoms.  Date of last physical exam " was within the past year. Client was encouraged to follow up with PCP if symptoms were to develop. The patient has a [facility] Primary Care Provider, Javi Denson MD, [facility]. Patient reports no current medical concerns.  Patient denies any issues with pain..   There are not significant appetite / nutritional concerns / weight changes. No known concussions, and no known head injury/trauma.       Patient reports current meds as:   Current Outpatient Medications   Medication    divalproex sodium extended-release (DEPAKOTE ER) 500 MG 24 hr tablet    propranolol (INDERAL) 20 MG tablet     No current facility-administered medications for this visit.     Patient reports he was prescribed Lexapro, but is not taking it, due to fear it may cause him to become more suicidal. Patient also reported he stopped taking Seroquel several weeks ago, because it increased his SI, but now his SI is back to his baseline.    Patient's most recent medication change included adding Mirtazapine. Patient is reporting improved mood, normal appetite, and less passive SI symptoms.     Medication Adherence:  Patient reports some medication adherence. The reason for past medication non-adherence is due to fear of side effects from the medications and he would like to not take mediations at all. Strategies for medication adherence include using MI to help increase change talk and help patient explore fears, and benefits.    Patient Allergies:    Allergies   Allergen Reactions    No Known Drug Allergy      Medical History:    Past Medical History:   Diagnosis Date    Chemical dependency (H)     daily marijuana use.     Other specified visual disturbances        Current Mental Status Exam:   Appearance:  Appropriate    Eye Contact:  Good   Psychomotor:  Hyperactive  Restless       Gait / station:  Unable to assess due to virtual appointment  Attitude / Demeanor: Cooperative   Speech      Rate / Production: Talkative       Volume:  Normal  volume      Language:  no problems  Mood:   Anxious  Depressed   Affect:   Appropriate    Thought Content: Rumination   Thought Process: Obsessive  Tangential       Associations: No loosening of associations  Insight:   Fair   Judgment:  Impaired   Orientation:  All  Attention/concentration: Easily Distracted, needs to be redirected      Substance Use:  Patient did report a family history of substance use concerns; see medical history section for details.  Patient has received chemical dependency treatment in the past at [facility] in the dual diagnosis program, when patient was 16 years old.  Patient has been to detox - reports 3 or 4 times.    Patient is not currently receiving any chemical dependency treatment.           Substance History of use Age of first use Date of last use     Pattern and duration of use (include amounts and frequency)   Alcohol used in the past   14 12/16/22 REPORTS SUBSTANCE USE: N/A   Cannabis   currently use 14 03/27/23 REPORTS SUBSTANCE USE: reports using substance a few joints times per week and has 1 - to several puffs at a time.   Patient reports heaviest use was earlier in the year.   Amphetamines   used in the past   01/01/23 REPORTS SUBSTANCE USE: N/A   Cocaine/crack    never used       REPORTS SUBSTANCE USE: N/A   Hallucinogens never used         REPORTS SUBSTANCE USE: N/A   Inhalants never used         REPORTS SUBSTANCE USE: N/A   Heroin never used         REPORTS SUBSTANCE USE: N/A   Other Opiates used in the past 24 02/10/20 REPORTS SUBSTANCE USE: N/A   Benzodiazepine   used in the past 22 01/01/23 REPORTS SUBSTANCE USE: N/A   Barbiturates never used     REPORTS SUBSTANCE USE: N/A   Over the counter meds never used     REPORTS SUBSTANCE USE: N/A   Caffeine  currently use 10   REPORTS SUBSTANCE USE: reports using substance 1 times per day and has 1 cup at a time.   Patient reports heaviest use was two years ago - he drank 4 caffeinated drinks or more  per day.    Nicotine  used in the past 11 12/16/22 REPORTS SUBSTANCE USE: N/A   Other substances not listed above:  Identify:  never used     REPORTS SUBSTANCE USE: N/A     Patient reported the following problems as a result of their substance use: family problems; occupational/vocational problems.    Substance Use: blackouts, daily use, substance related legal problems, substance use at school, skipping school due to substance use and social problems related to substance use (symptoms when patient was using many drugs). Patient reports this is the soberist I have been since earlier this year. Patient reports only using cannabis occasionally to help with anxiety symptoms. He reports the new medication his psychiatrist put him on for a few days, is helping him want to smoke less and overall feels better.     Based on the positive CAGE score and clinical interview there  are indications of drug or alcohol abuse. Writer will give recommendation for JANNETH evaluation.      Significant Losses / Trauma / Abuse / Neglect Issues:   Patient did not serve in the .  There are indications or report of significant loss, trauma, abuse or neglect issues related to: childhood trauma.    Concerns for possible neglect are not present.    Safety Assessment:   Patient denies current homicidal ideation and behaviors.  Patient denies current self-injurious ideation and behaviors.    Patient denied risk behaviors associated with substance use.  Patient denies any high risk behaviors reported substance use associated with mental health symptoms.  Patient reports the following current concerns for their personal safety: None.  Patient reports there are not firearms in the house.     History of Safety Concerns:  Patient denied a history of homicidal ideation.     Patient denied a history of personal safety concerns.    Patient denied a history of assaultive behaviors.  Patient did report fighting back when he was in  high-school.  Patient denied a history of sexual assault behaviors.     Patient denied a history of risk behaviors associated with substance use.  Patient reported a history of substance use associated with mental health symptoms.  Patient reports the following protective factors: dedication to family or friends; safe and stable environment; access to a variety of clinical interventions and pets    Risk Plan:  See Recommendations for Safety and Risk Management Plan    Review of Symptoms per patient report:   Depression: Change in sleep, Lack of interest, Change in energy level, Difficulties concentrating, Change in appetite, Suicidal ideation, Feelings of hopelessness, Feelings of helplessness, Low self-worth, Ruminations, Irritability, Feeling sad, down, or depressed and Withdrawn  Park:  Irritability, Decreased need for sleep, Distractibility and Impulsiveness  Psychosis: Auditory hallucinations (from taking prescribed medicines - in the past)  Anxiety: Excessive worry, Nervousness, Sleep disturbance, Ruminations, Poor concentration, Irritability and Anger outbursts  Panic:  Palpitations, Tingling and Sense of impending doom  Post Traumatic Stress Disorder:  Experienced traumatic event : childhood/teenage trauma, Reexperiencing of trauma, Avoids traumatic stimuli, Hypervigilance and Nightmares   Eating Disorder: No Symptoms  ADD / ADHD:  Inattentive, Distractibility, Forgetful, Impulsive, Restlessness/fidgety, Hyperverbal and Hyperactive  Conduct Disorder: Fights and Runs away (as a teenager)   Autism Spectrum Disorder: No symptoms  Obsessive Compulsive Disorder: Checking, Cleaning and Obsessions    Patient reports the following compulsive behaviors and treatment history: behaviors includes checking, cleaning, and obsessions. No prior OCD treatment. Reports history of hypersexuality (approximately two years), but reports symptoms have been in remission due to medications.  Patient reported his father had a  gambling addiction.     Diagnostic Criteria:   Generalized Anxiety Disorder  A. Excessive anxiety and worry about a number of events or activities (such as work or school performance).   B. The person finds it difficult to control the worry.   - Restlessness or feeling keyed up or on edge.    - Being easily fatigued.    - Difficulty concentrating or mind going blank.    - Irritability.    - Sleep disturbance (difficulty falling or staying asleep, or restless unsatisfying sleep).   D. The focus of the anxiety and worry is not confined to features of an Axis I disorder.  E. The anxiety, worry, or physical symptoms cause clinically significant distress or impairment in social, occupational, or other important areas of functioning.   F. The disturbance is not due to the direct physiological effects of a substance (e.g., a drug of abuse, a medication) or a general medical condition (e.g., hyperthyroidism) and does not occur exclusively during a Mood Disorder, a Psychotic Disorder, or a Pervasive Developmental Disorder. Major Depressive Disorder  CRITERIA (A-C) REPRESENT A MAJOR DEPRESSIVE EPISODE - SELECT THESE CRITERIA  A) Recurrent episode(s) - symptoms have been present during the same 2-week period and represent a change from previous functioning 5 or more symptoms (required for diagnosis)   - Depressed mood. Note: In children and adolescents, can be irritable mood.     - Diminished interest or pleasure in all, or almost all, activities.    - Decreased sleep.    - Fatigue or loss of energy.    - Feelings of worthlessness or excessive guilt.    - Diminished ability to think or concentrate, or indecisiveness.    - Recurrent thoughts of death (not just fear of dying), recurrent suicidal ideation without a specific plan, or a suicide attempt or a specific plan for committing suicide.   B) The symptoms cause clinically significant distress or impairment in social, occupational, or other important areas of functioning  C)  The episode is not attributable to the physiological effects of a substance or to another medical condition  D) The occurence of major depressive episode is not better explained by other thought / psychotic disorders  E) There has never been a manic episode or hypomanic episode    Functional Status:  Patient reports the following functional impairments:  academic performance, relationship(s), self-care, social interactions and work / vocational responsibilities.     Nonprogrammatic care:  Patient is requesting basic services to address current mental health concerns. Writer recommends additional programmatic care to help with symptom stabilization. Patient is unsure if he would like to attend programmatic care.     Clinical Summary:  1. Reason for assessment: anxiety and depression symptoms.  2. Psychosocial, Cultural and Contextual Factors: borderline intellectual functioning.  .  3. Principal DSM5 Diagnoses  (Sustained by DSM5 Criteria Listed Above):   296.33 (F33.2) Major Depressive Disorder, Recurrent Episode, Severe _  300.02 (F41.1) Generalized Anxiety Disorder.  4. Other Diagnoses that is relevant to services:   History of OCD, history of polysubstance abuse.  5. Provisional Diagnosis: further assessment is needed to rule out  296.42 Bipolar I Disorder Current or Most Recent Episode Manic, Moderate , and OCD, as evidenced by patient's reported symptoms, and patient's symptoms appearing in session. Patient reports psychological testing scheduled in the near future to help with diagnostic clarity. Writer will request the report, once completed.  6. Prognosis: Expect Improvement.  7. Likely consequences of symptoms if not treated: symptoms may become more severe and require inpatient admission, and further affect functioning.  8. Client strengths include: caring, motivated, open to learning, and supportive family.     Recommendations:     1. Plan for Safety and Risk Management:   Safety and Risk: A safety and  risk management plan has been developed including: Patient consented to co-developed safety plan on 7/7/23.  Safety and risk management plan was reviewed.   Patient agreed to use safety plan should any safety concerns arise.  A copy was made available to the patient. Copy is available in [facility] - progress note.            Report to child / adult protection services was NA.     2. Patient's identified the need to decrease depression and anxiety symptoms as important.     3. Initial Treatment will focus on:    Depressed Mood - identifying triggers that increase symptoms, helping patient to increase hope regarding medication management  Anxiety - identifying triggers that increase symptoms, helping patient to identify coping skills.     4. Resources/Service Plan:    services are not indicated.   Modifications to assist communication are not indicated.   Additional disability accommodations are not indicated.      5. Collaboration:   Collaboration / coordination of treatment will be initiated with the following support professionals: psychiatrists, referral for PHP, referral for ACT team, PCP - as needed.       New psychiatrist  Provider: RASHEED Carrion [place] [facility]  [address] [place] [address]   Phone: [phone]     Fax: [phone]   Release for me to speak to Leandra Daniels is still needed.    Primary Care Provider  Javi Denson MD  Referring MD on 3/2/23  Internal Medicine    Previous psychiatrist from 3/29/23  Osmar Ortiz MD  Part of [facility] ([facility] at [place].      6.  Referrals:   The following referral(s) will be initiated: Partial Hospitalization Program and ACT team.    Assertive Community Treatment (ACT) Teams  Provides psychiatric treatment, rehabilitation, and support to people with serious and persistent mental Illness or personality disorders (with severe functional impairments) so they can live independently  Works with people in their  homes, work setting or other community places    Team: [employer] [facility] ACT Team  Operating Agency: [employer]  Phone: [phone] Fax: [phone]  Address: [address] [place] [address]  General info: [email or web address]  Complete on-line form to request services: [email or web address]    Next Scheduled Appointment: July 21, 2023 at 12:30 pm.     A Release of Information has been requested for the following: Leandra Daniels, CYNDIE, and patient's mother and step- father (to help coordinate care and for collateral information).     Emergency Contact was obtained for patient's step father:   Georgina Will - Biological dad [phone]     Clinical Substantiation/medical necessity for the above recommendations: Patient's symptoms meet clinical necessity - without treatment symptoms may become more severe and further affect functionality. Patient s current functional impairments include occupational, social, academic, and self-care.    7. JANNETH:    JANNETH:  Discussed the general effects of drugs and alcohol on health and well-being. Recommendations: based on patient's use history and current cannabis use, writer recommends a CD evaluation. Patient is in the pre-contemplation/contemplation stages of change to quit cannabis. Writer will further discuss with patient.    8. Records:   These were reviewed at time of assessment.   Information in this assessment was obtained from the medical record and provided by patient and family who is a good historian.Patient will have open access to their mental health medical record.    9.   Interactive Complexity: Yes, visit entailed Interactive Complexity evidenced by:  -The need to manage maladaptive communication (related to, e.g., high anxiety, high reactivity, repeated questions, or disagreement) among participants that complicates delivery of care.      Provider Name/ Credentials: Elvi Mejía MA,  JENNY  7/16/23  Assessment reviewed and clinical supervision by JOVAN Hooks, [facility] 7/17/2023

## 2023-07-28 ENCOUNTER — VIRTUAL VISIT (OUTPATIENT)
Dept: PSYCHOLOGY | Facility: CLINIC | Age: 28
End: 2023-07-28
Payer: COMMERCIAL

## 2023-07-28 DIAGNOSIS — F41.1 GAD (GENERALIZED ANXIETY DISORDER): ICD-10-CM

## 2023-07-28 DIAGNOSIS — F33.2 SEVERE EPISODE OF RECURRENT MAJOR DEPRESSIVE DISORDER, WITHOUT PSYCHOTIC FEATURES (H): Primary | ICD-10-CM

## 2023-07-28 PROCEDURE — 90834 PSYTX W PT 45 MINUTES: CPT | Mod: VID

## 2023-07-28 NOTE — PROGRESS NOTES
"    [facility]      PATIENT'S NAME: Favio Will  PREFERRED NAME: Favio  PRONOUNS: he/him/his     MRN: [id number]  : 1995  ADDRESS: [address] [address] [facility] [address] [address]  [facility]  [id number]  DATE OF SERVICE: 23  START TIME: 1:30 pm  END TIME: 2:30 pm  PREFERRED PHONE: [phone]  May we leave a program related message: Yes  SERVICE MODALITY:  Video Visit:      Provider verified identity through the following two step process.  Patient provided:  Patient is known previously to provider    Telemedicine Visit: The patient's condition can be safely assessed and treated via synchronous audio and visual telemedicine encounter.      Reason for Telemedicine Visit: Patient has requested telehealth visit    Originating Site (Patient Location): Patient's home    Distant Site (Provider Location): [facility]    Consent:  The patient/guardian has verbally consented to: the potential risks and benefits of telemedicine (video visit) versus in person care; bill my insurance or make self-payment for services provided; and responsibility for payment of non-covered services.     Patient would like the video invitation sent by:  My Chart, if patient is not able to log in via [place], send text to patient's cell phone: [phone]     Mode of Communication:  Video Conference via [facility]    Distant Location (Provider):  Off-site    As the provider I attest to compliance with applicable laws and regulations related to telemedicine.    UNIVERSAL ADULT Mental Health DIAGNOSTIC ASSESSMENT    Identifying Information:  Patient is a 28 year old,   individual.  Patient was referred for an assessment by primary care provider.  Patient attended the session alone.    Chief Complaint:   The reason for seeking services at this time is: \"Anxiety, Depression, Suicidal thoughts, ADHD, OCD, Sexual issues\".  The problem(s) began since " childhood. Patient has history of anxiety, depression, polysubstance abuse, dual diagnosed IP treatment at [facility] when patient was 16, that included a stay of commitment, and borderline intellectual functioning.  Patient reports difficulties with rage, depression, anxiety, persistent SI thoughts (currently passive, but recently when he took Seroquel he had severe SI thoughts with plan).   Oppositional defiant diagnosis was a teenager.     Patient has attempted to resolve these concerns in the past through counseling, treatment, and medication management .     Social/Family History:  Patient reported they grew up in [place], [place].  They were raised by biological mother; stepfather  .  Parents  / .  Patient reported that their childhood was difficult.  Patient described their current relationships with family of origin as good.    The patient describes their cultural background as . Cultural influences and impact on patient's life structure, values, norms, and healthcare: history of drug use and treatment. Contextual influences on patient's health include: Contextual Factors: Individual Factors - borderline intellectual functioning.   These factors will be addressed in the Preliminary Treatment plan. Patient identified their preferred language to be English. Patient reported they does not need the assistance of an  or other support involved in therapy.     Patient reported  he was in special education in school. According chart review, patient was diagnosed with borderline intellectual functioning, as a youth. Specific developmental delays is unknown (was not able to assess due to patient's symptoms interferring during the interview) . Patient's highest education level was high school graduate. Patient identified the following learning problems: concentration and (patient had IEP, but could not identify learning difficulties beyond concentration.  Modifications  will not be used to assist communication in therapy. Patient reports they are  able to understand written materials.    Patient reported the following relationship history: never .  Patient's current relationship status is single. Patient identified their sexual orientation as heterosexual.  Patient reported having no child(citlalli). Patient identified parents; pets as part of their support system.  Patient identified the quality of these relationships as fair.    Patient's current living/housing situation involves living with his parents. The immediate members of family and household include Bandar Weaver, Anil,Biological Mother, Alexi, Step Father, and they report that housing is stable.    Patient is currently unemployed.  Patient reports their finances are obtained through parents. Patient does identify finances as a current stressor.      Patient reported that they have not been involved with the legal system. Per chart review, patient was on a stay of commitment when patient was 16 and attending the dual diagnosis inpatient program at [facility]. Patient does not report being under probation/ parole/ jurisdiction. They are not under any current court jurisdiction. .    Patient's Strengths and Limitations:  Patient identified the following strengths or resources that will help them succeed in treatment:  family support . Things that may interfere with the patient's success in treatment include: lack of social support.     Assessments:  The following assessments were completed by patient for this visit:  PHQ9:       2/7/2022    12:33 PM 2/27/2023     3:02 PM 3/29/2023     2:47 PM 6/15/2023    10:36 AM 7/6/2023    10:59 AM 7/14/2023     8:07 AM   PHQ-9 SCORE   PHQ-9 Total Score MyChart 25 (Severe depression) 21 (Severe depression) 26 (Severe depression) 23 (Severe depression) 25 (Severe depression) 24 (Severe depression)   PHQ-9 Total Score 25 21 26 23 25 24     GAD7:       2/27/2023     3:07 PM  3/27/2023     4:03 PM 6/13/2023     9:09 AM   RENATA-7 SCORE   Total Score 21 (severe anxiety) 21 (severe anxiety) 20 (severe anxiety)   Total Score 21 21 20     CAGE-AID:       3/27/2023     4:07 PM   CAGE-AID Total Score   Total Score 4   Total Score MyChart 4 (A total score of 2 or greater is considered clinically significant)     PROMIS 10-Global Health (only subscores and total score):       3/27/2023     4:06 PM 6/15/2023    10:37 AM   PROMIS-10 Scores Only   Global Mental Health Score 4 4   Global Physical Health Score 6 7   PROMIS TOTAL - SUBSCORES 10 11     [place] Suicide Severity Rating Scale (Lifetime/Recent)      7/4/2011    10:09 PM 8/16/2011    11:57 PM 6/15/2023     1:42 PM   [place] Suicide Severity Rating (Lifetime/Recent)   Do you have guns available to you? No No    Q1 Wish to be Dead (Lifetime)   Y   Wish to be Dead Description (Lifetime)   2018, felt trapped, depressed, relationship break-up,   1. Wish to be Dead (Past 1 Month)   Y   Q2 Non-Specific Active Suicidal Thoughts (Lifetime)   Y   2. Non-Specific Active Suicidal Thoughts (Past 1 Month)   Y   Non-Specific Active Suicidal Thought Description (Past 1 Month)   Passive thoughts.   3. Active Suicidal Ideation with any Methods (Not Plan) Without Intent to Act (Lifetime)   Y   Active Suicidal Ideation with any Methods (Not Plan) Description (Lifetime)   Sometimes has ideas of pills.   Q3 Active Suicidal Ideation with any Methods (Not Plan) Without Intent to Act (Past 1 Month)   Y   Active Suicidal Ideation with any Methods (Not Plan) Description (Past 1 Month)   Yes, passive thoughts. No active plan, not planning.   Q4 Active Suicidal Ideation with Some Intent to Act, Without Specific Plan (Lifetime)   N   4. Active Suicidal Ideation with Some Intent to Act, Without Specific Plan (Past 1 Month)   N   Q5 Active Suicidal Ideation with Specific Plan and Intent (Lifetime)   N   5. Active Suicidal Ideation with Specific Plan and Intent (Past 1  "Month)   N   Most Severe Ideation Rating (Lifetime)   5   Description of Most Severe Ideation (Lifetime)   \"Feeling depressed, stuck.\"   Most Severe Ideation Rating (Past 1 Month)   5   Description of Most Severe Ideation (Past 1 Month)   \"Shitty feeling, worst feeling in the world.\"   Frequency (Lifetime)   3   Frequency (Past 1 Month)   3   Duration (Lifetime)   1   Duration (Past 1 Month)   1   Controllability (Lifetime)   0   Controllability (Past 1 Month)   0   Deterrents (Lifetime)   1   Deterrents (Past 1 Month)   1   Reasons for Ideation (Lifetime)   5   Reasons for Ideation (Past 1 Month)   5   Actual Attempt (Lifetime)   N   Has subject engaged in non-suicidal self-injurious behavior? (Lifetime)   N   Interrupted Attempts (Lifetime)   N   Aborted or Self-Interrupted Attempt (Lifetime)   N   Preparatory Acts or Behavior (Lifetime)   N   Calculated C-SSRS Risk Score (Lifetime/Recent)   Moderate Risk       Personal and Family Medical History:  Patient does report a family history of mental health concerns.  Patient reports family history is not on file. Family history includes depression, and substance abuse.    Patient does report Mental Health Diagnosis and/or Treatment.  Patient Patient reported the following previous diagnoses which include(s): ADHD, anxiety disorder, depression, OCD, ODD, polysubstance abuse, opioid abuse, benzo abuse. Patient reported symptoms began in childhood.   Patient has received mental health services in the past:  counseling, medication management, dual diagnosis treatment at [facility] when patient was 16 .  Psychiatric Hospitalizations:  [facility], dual diagnosis IP .  Patient  patient had a stay of commitment at age 16 .  Patient is receiving other mental health services.  These include  counseling with writer, and medication management with  RASHEED Carrion, at [facility] in [place].    Patient has had a physical exam to rule out medical causes for current symptoms.  " Date of last physical exam was within the past year. Client was encouraged to follow up with PCP if symptoms were to develop. The patient has a [facility] Care Provider, Javi Denson MD, [facility]. Patient reports no current medical concerns.  Patient denies any issues with pain..   There are not significant appetite / nutritional concerns / weight changes. No known concussions, and no known head injury/trauma.       Patient reports current meds as:   Current Outpatient Medications   Medication    divalproex sodium extended-release (DEPAKOTE ER) 500 MG 24 hr tablet    propranolol (INDERAL) 20 MG tablet     No current facility-administered medications for this visit.     Patient reports he was prescribed Lexapro, but is not taking it, due to fear it may cause him to become more suicidal. Patient also reported he stopped taking Seroquel several weeks ago, because it increased his SI, but now his SI is back to his baseline.    Patient's most recent medication change included adding Mirtazapine. Patient is reporting improved mood, normal appetite, and less passive SI symptoms.     Medication Adherence:  Patient reports some medication adherence. The reason for past medication non-adherence is due to fear of side effects from the medications and he would like to not take mediations at all. Strategies for medication adherence include using MI to help increase change talk and help patient explore fears, and benefits.    Patient Allergies:    Allergies   Allergen Reactions    No Known Drug Allergy      Medical History:    Past Medical History:   Diagnosis Date    Chemical dependency (H)     daily marijuana use.     Other specified visual disturbances        Current Mental Status Exam:   Appearance:  Appropriate    Eye Contact:  Good   Psychomotor:  Hyperactive  Restless       Gait / station:  Unable to assess due to virtual appointment  Attitude / Demeanor: Cooperative   Speech      Rate /  Production: Talkative      Volume:  Normal  volume      Language:  no problems  Mood:   Anxious  Depressed   Affect:   Appropriate    Thought Content: Rumination   Thought Process: Obsessive  Tangential       Associations: No loosening of associations  Insight:   Fair   Judgment:  Impaired   Orientation:  All  Attention/concentration: Easily Distracted, needs to be redirected      Substance Use:  Patient did report a family history of substance use concerns; see medical history section for details.  Patient has received chemical dependency treatment in the past at [facility] in the dual diagnosis program, when patient was 16 years old .  Patient has been to detox - reports 3 or 4 times.    Patient is not currently receiving any chemical dependency treatment.           Substance History of use Age of first use Date of last use     Pattern and duration of use (include amounts and frequency)   Alcohol used in the past   14 12/16/22 REPORTS SUBSTANCE USE: N/A   Cannabis   currently use 14 03/27/23 REPORTS SUBSTANCE USE: reports using substance a few joints times per week and has 1 - to several puffs at a time.   Patient reports heaviest use was earlier in the year.   Amphetamines   used in the past   01/01/23 REPORTS SUBSTANCE USE: N/A   Cocaine/crack    never used       REPORTS SUBSTANCE USE: N/A   Hallucinogens never used         REPORTS SUBSTANCE USE: N/A   Inhalants never used         REPORTS SUBSTANCE USE: N/A   Heroin never used         REPORTS SUBSTANCE USE: N/A   Other Opiates used in the past 24 02/10/20 REPORTS SUBSTANCE USE: N/A   Benzodiazepine   used in the past 22 01/01/23 REPORTS SUBSTANCE USE: N/A   Barbiturates never used     REPORTS SUBSTANCE USE: N/A   Over the counter meds never used     REPORTS SUBSTANCE USE: N/A   Caffeine  currently use 10   REPORTS SUBSTANCE USE: reports using substance 1 times per day and has 1 cup at a time.   Patient reports heaviest use was two years ago - he drank 4  caffeinated drinks or more per day.    Nicotine  used in the past 11 12/16/22 REPORTS SUBSTANCE USE: N/A   Other substances not listed above:  Identify:  never used     REPORTS SUBSTANCE USE: N/A     Patient reported the following problems as a result of their substance use: family problems; occupational/vocational problems.    Substance Use: blackouts, daily use, substance related legal problems, substance use at school, skipping school due to substance use and social problems related to substance use (symptoms when patient was using many drugs). Patient reports this is the soberist I have been since earlier this year. Patient reports only using cannabis occasionally to help with anxiety symptoms. He reports the new medication his psychiatrist put him on for a few days, is helping him want to smoke less and overall feels better.     Based on the positive CAGE score and clinical interview there  are indications of drug or alcohol abuse. Writer will give recommendation for JANNETH evaluation.      Significant Losses / Trauma / Abuse / Neglect Issues:   Patient did not serve in the .  There are indications or report of significant loss, trauma, abuse or neglect issues related to: childhood trauma.    Concerns for possible neglect are not present.    Safety Assessment:   Patient denies current homicidal ideation and behaviors.  Patient denies current self-injurious ideation and behaviors.    Patient denied risk behaviors associated with substance use.  Patient denies any high risk behaviors reported substance use associated with mental health symptoms.  Patient reports the following current concerns for their personal safety: None.  Patient reports there are not firearms in the house.     History of Safety Concerns:  Patient denied a history of homicidal ideation.     Patient denied a history of personal safety concerns.    Patient denied a history of assaultive behaviors.  Patient did report fighting back when he  was in high-school.  Patient denied a history of sexual assault behaviors.     Patient denied a history of risk behaviors associated with substance use.  Patient reported a history of substance use associated with mental health symptoms.  Patient reports the following protective factors: dedication to family or friends; safe and stable environment; access to a variety of clinical interventions and pets    Risk Plan:  See Recommendations for Safety and Risk Management Plan    Review of Symptoms per patient report:   Depression: Change in sleep, Lack of interest, Change in energy level, Difficulties concentrating, Change in appetite, Suicidal ideation, Feelings of hopelessness, Feelings of helplessness, Low self-worth, Ruminations, Irritability, Feeling sad, down, or depressed and Withdrawn  Park:  Irritability, Decreased need for sleep, Distractibility and Impulsiveness  Psychosis: Auditory hallucinations (from taking prescribed medicines - in the past)  Anxiety: Excessive worry, Nervousness, Sleep disturbance, Ruminations, Poor concentration, Irritability and Anger outbursts  Panic:  Palpitations, Tingling and Sense of impending doom  Post Traumatic Stress Disorder:  Experienced traumatic event : childhood/teenage trauma, Reexperiencing of trauma, Avoids traumatic stimuli, Hypervigilance and Nightmares   Eating Disorder: No Symptoms  ADD / ADHD:  Inattentive, Distractibility, Forgetful, Impulsive, Restlessness/fidgety, Hyperverbal and Hyperactive  Conduct Disorder: Fights and Runs away (as a teenager)   Autism Spectrum Disorder: No symptoms  Obsessive Compulsive Disorder: Checking, Cleaning and Obsessions    Patient reports the following compulsive behaviors and treatment history:  behaviors includes checking, cleaning, and obsessions. No prior OCD treatment . Reports history of hypersexuality (approximately two years), but reports symptoms have been in remission due to medications.  Patient reported his father had  a gambling addiction.     Diagnostic Criteria:   Generalized Anxiety Disorder  A. Excessive anxiety and worry about a number of events or activities (such as work or school performance).   B. The person finds it difficult to control the worry.   - Restlessness or feeling keyed up or on edge.    - Being easily fatigued.    - Difficulty concentrating or mind going blank.    - Irritability.    - Sleep disturbance (difficulty falling or staying asleep, or restless unsatisfying sleep).   D. The focus of the anxiety and worry is not confined to features of an Axis I disorder.  E. The anxiety, worry, or physical symptoms cause clinically significant distress or impairment in social, occupational, or other important areas of functioning.   F. The disturbance is not due to the direct physiological effects of a substance (e.g., a drug of abuse, a medication) or a general medical condition (e.g., hyperthyroidism) and does not occur exclusively during a Mood Disorder, a Psychotic Disorder, or a Pervasive Developmental Disorder. Major Depressive Disorder  CRITERIA (A-C) REPRESENT A MAJOR DEPRESSIVE EPISODE - SELECT THESE CRITERIA  A) Recurrent episode(s) - symptoms have been present during the same 2-week period and represent a change from previous functioning 5 or more symptoms (required for diagnosis)   - Depressed mood. Note: In children and adolescents, can be irritable mood.     - Diminished interest or pleasure in all, or almost all, activities.    - Decreased sleep.    - Fatigue or loss of energy.    - Feelings of worthlessness or excessive guilt.    - Diminished ability to think or concentrate, or indecisiveness.    - Recurrent thoughts of death (not just fear of dying), recurrent suicidal ideation without a specific plan, or a suicide attempt or a specific plan for committing suicide.   B) The symptoms cause clinically significant distress or impairment in social, occupational, or other important areas of  functioning  C) The episode is not attributable to the physiological effects of a substance or to another medical condition  D) The occurence of major depressive episode is not better explained by other thought / psychotic disorders  E) There has never been a manic episode or hypomanic episode    Functional Status:  Patient reports the following functional impairments:  academic performance, relationship(s), self-care, social interactions and work / vocational responsibilities.     Nonprogrammatic care:  Patient is requesting basic services to address current mental health concerns. Writer recommends additional programmatic care to help with symptom stabilization. Patient is unsure if he would like to attend programmatic care.     Clinical Summary:  1. Reason for assessment: anxiety and depression symptoms.  2. Psychosocial, Cultural and Contextual Factors: borderline intellectual functioning.  .  3. Principal DSM5 Diagnoses  (Sustained by DSM5 Criteria Listed Above):   296.33 (F33.2) Major Depressive Disorder, Recurrent Episode, Severe _  300.02 (F41.1) Generalized Anxiety Disorder.  4. Other Diagnoses that is relevant to services:   History of OCD, history of polysubstance abuse.  5. Provisional Diagnosis: further assessment is needed to rule out  296.42 Bipolar I Disorder Current or Most Recent Episode Manic, Moderate , and OCD, as evidenced by patient's reported symptoms, and patient's symptoms appearing in session. Patient reports psychological testing scheduled in the near future to help with diagnostic clarity. Writer will request the report, once completed.  6. Prognosis: Expect Improvement.  7. Likely consequences of symptoms if not treated: symptoms may become more severe and require inpatient admission, and further affect functioning.  8. Client strengths include: caring, motivated, open to learning, and supportive family.     Recommendations:     1. Plan for Safety and Risk Management:   Safety and Risk:  A safety and risk management plan has been developed including: Patient consented to co-developed safety plan on 7/7/23.  Safety and risk management plan was reviewed.   Patient agreed to use safety plan should any safety concerns arise.  A copy was made available to the patient. Copy is available in [employer] - progress note.            Report to child / adult protection services was NA.     2. Patient's identified  the need to decrease depression and anxiety symptoms as important .     3. Initial Treatment will focus on:    Depressed Mood - identifying triggers that increase symptoms, helping patient to increase hope regarding medication management  Anxiety - identifying triggers that increase symptoms, helping patient to identify coping skills .     4. Resources/Service Plan:    services are not indicated.   Modifications to assist communication are not indicated.   Additional disability accommodations are not indicated.      5. Collaboration:   Collaboration / coordination of treatment will be initiated with the following support professionals:  psychiatrists, referral for PHP, referral for ACT team, PCP - as needed .       New psychiatrist  Provider: RASHEED Carrion [address]   Phone: [phone]     Fax: [phone]   Release for me to speak to Leandra Daniels is still needed.    Primary Care Provider  Javi Denson MD  Referring MD on 3/2/23  Internal Medicine    Previous psychiatrist from 3/29/23  Osmar Ortiz MD  Part of [facility] ([facility] at [facility].      6.  Referrals:   The following referral(s) will be initiated: Partial Hospitalization Program and ACT team.    Assertive Community Treatment (ACT) Teams  Provides psychiatric treatment, rehabilitation, and support to people with serious and persistent mental Illness or personality disorders (with severe functional impairments) so they can live independently  Works  with people in their homes, work setting or other community places    Team: [facility] ACT Team  Operating Agency: [facility]  Phone: [phone] Fax: [phone]  Address: [address] [place] [address]  General info: [email or web address]  Complete on-line form to request services: [email or web address]    Next Scheduled Appointment: July 21, 2023 at 12:30 pm.     A Release of Information has been requested for the following:  CYNDIE Carrion, and patient's mother and step- father (to help coordinate care and for collateral information) .     Emergency Contact was obtained for patient's step father:   Georgina Will - Biological dad [phone]     Clinical Substantiation/medical necessity for the above recommendations: Patient's symptoms meet clinical necessity - without treatment symptoms may become more severe and further affect functionality. Patient s current functional impairments include occupational, social, academic, and self-care.    7. JANNETH:    JANNETH:  Discussed the general effects of drugs and alcohol on health and well-being. Recommendations: based on patient's use history and current cannabis use, writer recommends a CD evaluation. Patient is in the pre-contemplation/contemplation stages of change to quit cannabis. Writer will further discuss with patient.    8. Records:   These were reviewed at time of assessment.   Information in this assessment was obtained from the medical record and provided by patient and family who is a good historian.Patient will have open access to their mental health medical record.    9.   Interactive Complexity: Yes, visit entailed Interactive Complexity evidenced by:  -The need to manage maladaptive communication (related to, e.g., high anxiety, high reactivity, repeated questions, or disagreement) among participants that complicates delivery of care.      Provider Name/ Credentials: Elvi  LINDSAY Mejía, LPC  7/16/23  Assessment reviewed and clinical supervision by JOVAN Hooks, LICSW 7/17/2023

## 2023-07-28 NOTE — PROGRESS NOTES
[facility]                                     Progress Note    Patient Name: Favio Will  Date: 7/28/23          Service Type: Individual      Session Start Time: 1:40 pm  Session End Time: 2:25 pm     Session Length: 45 minutes    Session #: 4    Attendees: Client attended alone    Service Modality:  Video Visit:      Provider verified identity through the following two step process.  Patient provided:  Patient is known previously to provider    Telemedicine Visit: The patient's condition can be safely assessed and treated via synchronous audio and visual telemedicine encounter.      Reason for Telemedicine Visit: Patient has requested telehealth visit    Originating Site (Patient Location): Patient's home    Distant Site (Provider Location): Provider Remote Setting- Home Office    Consent:  The patient/guardian has verbally consented to: the potential risks and benefits of telemedicine (video visit) versus in person care; bill my insurance or make self-payment for services provided; and responsibility for payment of non-covered services.     Patient would like the video invitation sent by:  My Chart    Mode of Communication:  Video Conference via [employer]    Distant Location (Provider):  Off-site    As the provider I attest to compliance with applicable laws and regulations related to telemedicine.    DATA  Interactive Complexity: No  Crisis: No        Progress Since Last Session (Related to Symptoms / Goals / Homework):   Symptoms: Improving - patient reports improved symptoms, since last session. He reports continuing to take the new medication prescribed by his psychiatry provider, and he is experiencing more calm, less irritability, less anger, and denied SI.    Homework: Achieved / completed to satisfaction      Episode of Care Goals: Minimal progress - PREPARATION (Decided to change - considering how); Intervened by negotiating a change plan and determining options / strategies for  behavior change, identifying triggers, exploring social supports, and working towards setting a date to begin behavior change     Current / Ongoing Stressors and Concerns:   Depression symptoms, previous depression symptoms not managed well with prior medications, loss of a girlfriend, loss of his job last January, history of substance use abuse.      Treatment Objective(s) Addressed in This Session:   Discuss motivation / ambivalence about taking anti-depressant / mood stabilizer medication(s)     Intervention:   Patient explored his beliefs about taking medications daily for mental illness, pros and cons of attending IOP, and his current symptoms. Patient reports improved symptoms since last session. Denied SI, and reports the new medication is helping. Patient does not want to take medications, but continues to take them. He stated he should try Lexapro (was prescribed to him), but has been hesitant to try it, due to his experience with Seroquel causing SI.   Writer used encouragement, validation, and reflective listening skills to increase positive change talk.    Writer made recommendations for Act team, and county services in session, and sent details to patient's Right [employer] Interactive messages. Patient declined to sign ROIs, at this time.    Assessments completed prior to visit:  The following assessments were completed by patient for this visit:  none       ASSESSMENT: Current Emotional / Mental Status (status of significant symptoms):   Risk status (Self / Other harm or suicidal ideation)   Patient denies current fears or concerns for personal safety.   Patient denies current or recent suicidal ideation or behaviors.   Patient denies current or recent homicidal ideation or behaviors.   Patient denies current or recent self injurious behavior or ideation.   Patient denies other safety concerns.   Patient reports there has been no change in risk factors since their last session.     Patient reports there has been no change  in protective factors since their last session.     A safety and risk management plan has been developed including: Patient consented to co-developed safety plan on 6/15/23.  Safety and risk management plan was reviewed.   Patient agreed to use safety plan should any safety concerns arise.  A copy was made available to the patient.     Appearance:   Appropriate    Eye Contact:   Good    Psychomotor Behavior: Normal    Attitude:   Cooperative    Orientation:   All   Speech    Rate / Production: Talkative    Volume:  Normal    Mood:    Sad    Affect:    Appropriate    Thought Content:  Clear    Thought Form:  Logical    Insight:    Fair      Medication Review:   No changes to current psychiatric medication(s)     Medication Compliance:   Patient reports being compliant with some psych medications, but is not taking Lexapro, yet.     Changes in Health Issues:   None reported     Chemical Use Review:   Substance Use: patient reports smoking cannabis to help with his anxiety symptoms. Patient reports the same level of use.     Tobacco Use: No current tobacco use.      Diagnosis:  1. Severe episode of recurrent major depressive disorder, without psychotic features (H)    2. RENATA (generalized anxiety disorder)        Collateral Reports Completed:   Patient declined to sign ROIs for writer to communicate with his new psychiatrist, and to communicate with his parents.    PLAN: (Patient Tasks / Therapist Tasks / Other)  Patient will continue to take his new medication, as prescribed, and will further contemplate IOP. Patient declines IOP referral, at this time. Writer sent referral for ACT team and [facility] contact information for additional services patient may qualify for, sent to patient via [employer] and also discussed with patient during session. Will review treatment plan with patient at next session.        Elvi Mejía MA, [facility]  7/30/23  Note reviewed and clinical supervision by JOVAN Hooks, [facility] 8/3/2023                                                           ______________________________________________________________________    Individual Treatment Plan    Patient's Name: Favio Will  YOB: 1995    Date of Creation: 7/28/23  Date Treatment Plan Last Reviewed/Revised: 7/28/23    DSM5 Diagnoses: 296.33 (F33.2) Major Depressive Disorder, Recurrent Episode, Severe _ or 300.02 (F41.1) Generalized Anxiety Disorder  Psychosocial / Contextual Factors: unemployed, loss of a romantic relationship  PROMIS (reviewed every 90 days): The following assessments were completed by patient for this visit:  PROMIS 10-Global Health (only subscores and total score):       3/27/2023     4:06 PM 6/15/2023    10:37 AM   PROMIS-10 Scores Only   Global Mental Health Score 4 4   Global Physical Health Score 6 7   PROMIS TOTAL - SUBSCORES 10 11       Referral / Collaboration:  The following referral(s) was/were discussed but patient declines follow up at this time. IOP/PHP programming . Patient declined to sign ROIs for writer to speak with patient's new psychiatrist and with his parents, at this time.    Anticipated number of session for this episode of care: 9-12 sessions  Anticipation frequency of session:  weekly and every other week .  Anticipated Duration of each session: 38-52 minutes  Treatment plan will be reviewed in 90 days or when goals have been changed.     MeasurableTreatment Goal(s) related to diagnosis / functional impairment(s)  Goal 1: Patient will decrease depression and anxiety symptoms.      Objective #A (Patient Action)                          Patient will Identify negative self-talk and behaviors: challenge core beliefs, myths, and actions.  Status: New- 7/28/23     Intervention(s)  Therapist will teach CBT, how to challenge core beliefs, and will assign homework to help patient change the core beliefs.     Objective #B  Patient will Increase interest, engagement, and pleasure in doing  things.   Status: New- 7/28/23     Intervention(s)  Therapist will assign homework to help patient increase interest, engagement, and pleasure in doing things.     Objective #C  Patient will increase understanding of steps in the grief process.   Status: New- 7/28/23     Intervention(s)  Therapist will teach steps in the grief process, use encouragement, validation, and supportive listening to help patient process his grief.     Objective #D  Patient will explore and process his ambivalence regarding taking psychotropic medications.     Intervention(s)   Therapist will help patient better understand his beliefs about taking psychotropic  medications, barriers, pros and cons, and will provide psychoeducation.     Objective #E   Patient will practice using healthy coping skills.   Status: New- 7/28/23     Intervention(s)   Therapist will teach healthy coping skills, help patient to identify effective skills, and will  assign homework.        Patient has not reviewed nor agreed to the above plan.    Elvi Mejía MA, [facility]  7/30/23  Treatment plan reviewed and clinical supervision by JOVAN Hooks, [facility] 8/3/2023

## 2023-08-21 ASSESSMENT — ANXIETY QUESTIONNAIRES
IF YOU CHECKED OFF ANY PROBLEMS ON THIS QUESTIONNAIRE, HOW DIFFICULT HAVE THESE PROBLEMS MADE IT FOR YOU TO DO YOUR WORK, TAKE CARE OF THINGS AT HOME, OR GET ALONG WITH OTHER PEOPLE: EXTREMELY DIFFICULT
7. FEELING AFRAID AS IF SOMETHING AWFUL MIGHT HAPPEN: NEARLY EVERY DAY
2. NOT BEING ABLE TO STOP OR CONTROL WORRYING: NEARLY EVERY DAY
4. TROUBLE RELAXING: NEARLY EVERY DAY
GAD7 TOTAL SCORE: 21
6. BECOMING EASILY ANNOYED OR IRRITABLE: NEARLY EVERY DAY
GAD7 TOTAL SCORE: 21
1. FEELING NERVOUS, ANXIOUS, OR ON EDGE: NEARLY EVERY DAY
3. WORRYING TOO MUCH ABOUT DIFFERENT THINGS: NEARLY EVERY DAY
5. BEING SO RESTLESS THAT IT IS HARD TO SIT STILL: NEARLY EVERY DAY

## 2023-08-22 ENCOUNTER — OFFICE VISIT (OUTPATIENT)
Dept: INTERNAL MEDICINE | Facility: CLINIC | Age: 28
End: 2023-08-22
Payer: COMMERCIAL

## 2023-08-22 VITALS
WEIGHT: 188.8 LBS | BODY MASS INDEX: 27.96 KG/M2 | DIASTOLIC BLOOD PRESSURE: 89 MMHG | RESPIRATION RATE: 18 BRPM | OXYGEN SATURATION: 95 % | SYSTOLIC BLOOD PRESSURE: 126 MMHG | HEIGHT: 69 IN | HEART RATE: 77 BPM | TEMPERATURE: 98 F

## 2023-08-22 DIAGNOSIS — R68.82 LOW LIBIDO: ICD-10-CM

## 2023-08-22 DIAGNOSIS — R45.4 ANGER: ICD-10-CM

## 2023-08-22 DIAGNOSIS — Z00.00 ENCOUNTER FOR PREVENTATIVE ADULT HEALTH CARE EXAMINATION: ICD-10-CM

## 2023-08-22 DIAGNOSIS — F41.9 ANXIETY: Primary | ICD-10-CM

## 2023-08-22 DIAGNOSIS — N52.2 DRUG-INDUCED ERECTILE DYSFUNCTION: ICD-10-CM

## 2023-08-22 LAB
ALBUMIN SERPL BCG-MCNC: 5.2 G/DL (ref 3.5–5.2)
ALP SERPL-CCNC: 76 U/L (ref 40–129)
ALT SERPL W P-5'-P-CCNC: 43 U/L (ref 0–70)
ANION GAP SERPL CALCULATED.3IONS-SCNC: 11 MMOL/L (ref 7–15)
AST SERPL W P-5'-P-CCNC: 29 U/L (ref 0–45)
BILIRUB SERPL-MCNC: 0.4 MG/DL
BUN SERPL-MCNC: 11.1 MG/DL (ref 6–20)
CALCIUM SERPL-MCNC: 9.5 MG/DL (ref 8.6–10)
CHLORIDE SERPL-SCNC: 100 MMOL/L (ref 98–107)
CHOLEST SERPL-MCNC: 244 MG/DL
CREAT SERPL-MCNC: 0.83 MG/DL (ref 0.67–1.17)
DEPRECATED HCO3 PLAS-SCNC: 28 MMOL/L (ref 22–29)
ERYTHROCYTE [DISTWIDTH] IN BLOOD BY AUTOMATED COUNT: 11.9 % (ref 10–15)
GFR SERPL CREATININE-BSD FRML MDRD: >90 ML/MIN/1.73M2
GLUCOSE SERPL-MCNC: 90 MG/DL (ref 70–99)
HCT VFR BLD AUTO: 47.3 % (ref 40–53)
HDLC SERPL-MCNC: 45 MG/DL
HGB BLD-MCNC: 16.2 G/DL (ref 13.3–17.7)
LDLC SERPL CALC-MCNC: 168 MG/DL
MCH RBC QN AUTO: 29.6 PG (ref 26.5–33)
MCHC RBC AUTO-ENTMCNC: 34.2 G/DL (ref 31.5–36.5)
MCV RBC AUTO: 86 FL (ref 78–100)
NONHDLC SERPL-MCNC: 199 MG/DL
PLATELET # BLD AUTO: 238 10E3/UL (ref 150–450)
POTASSIUM SERPL-SCNC: 5.1 MMOL/L (ref 3.4–5.3)
PROT SERPL-MCNC: 8.5 G/DL (ref 6.4–8.3)
RBC # BLD AUTO: 5.48 10E6/UL (ref 4.4–5.9)
SODIUM SERPL-SCNC: 139 MMOL/L (ref 136–145)
TRIGL SERPL-MCNC: 153 MG/DL
TSH SERPL DL<=0.005 MIU/L-ACNC: 1.31 UIU/ML (ref 0.3–4.2)
VALPROATE SERPL-MCNC: 76.8 UG/ML
WBC # BLD AUTO: 10.3 10E3/UL (ref 4–11)

## 2023-08-22 PROCEDURE — 85027 COMPLETE CBC AUTOMATED: CPT | Performed by: INTERNAL MEDICINE

## 2023-08-22 PROCEDURE — 80164 ASSAY DIPROPYLACETIC ACD TOT: CPT | Performed by: INTERNAL MEDICINE

## 2023-08-22 PROCEDURE — 84270 ASSAY OF SEX HORMONE GLOBUL: CPT | Performed by: INTERNAL MEDICINE

## 2023-08-22 PROCEDURE — 84443 ASSAY THYROID STIM HORMONE: CPT | Performed by: INTERNAL MEDICINE

## 2023-08-22 PROCEDURE — 80053 COMPREHEN METABOLIC PANEL: CPT | Performed by: INTERNAL MEDICINE

## 2023-08-22 PROCEDURE — 84403 ASSAY OF TOTAL TESTOSTERONE: CPT | Performed by: INTERNAL MEDICINE

## 2023-08-22 PROCEDURE — 80061 LIPID PANEL: CPT | Performed by: INTERNAL MEDICINE

## 2023-08-22 PROCEDURE — 99214 OFFICE O/P EST MOD 30 MIN: CPT | Performed by: INTERNAL MEDICINE

## 2023-08-22 PROCEDURE — 36415 COLL VENOUS BLD VENIPUNCTURE: CPT | Performed by: INTERNAL MEDICINE

## 2023-08-22 RX ORDER — MIRTAZAPINE 7.5 MG/1
7.5 TABLET, FILM COATED ORAL
COMMUNITY
Start: 2023-08-08

## 2023-08-22 RX ORDER — HYDROXYZINE PAMOATE 25 MG/1
CAPSULE ORAL
COMMUNITY
Start: 2023-08-08

## 2023-08-22 RX ORDER — BUSPIRONE HYDROCHLORIDE 7.5 MG/1
1 TABLET ORAL
COMMUNITY
Start: 2023-08-08

## 2023-08-22 ASSESSMENT — PAIN SCALES - GENERAL: PAINLEVEL: EXTREME PAIN (9)

## 2023-08-22 NOTE — PROGRESS NOTES
"  Assessment & Plan     Encounter for preventative adult health care examination  Assess preventive lab   - Lipid panel reflex to direct LDL Non-fasting  - CBC with platelets  - Comprehensive metabolic panel (BMP + Alb, Alk Phos, ALT, AST, Total. Bili, TP)  - TSH with free T4 reflex    Drug-induced erectile dysfunction  Assess T level   - Testosterone Free and Total    Low libido  Consider treatment for ED   - Testosterone Free and Total    Anxiety  Follow up with psychiatry   - CBC with platelets  - Comprehensive metabolic panel (BMP + Alb, Alk Phos, ALT, AST, Total. Bili, TP)  - TSH with free T4 reflex  - Valproic acid    Anger  Possible bipolar disorder, psychiatry follow up recommended              BMI:   Estimated body mass index is 28.08 kg/m  as calculated from the following:    Height as of this encounter: 1.746 m (5' 8.75\").    Weight as of this encounter: 85.6 kg (188 lb 12.8 oz).   Weight management plan: Discussed healthy diet and exercise guidelines    See Patient Instructions    Javi Denson MD  [facility] is a 28 year [facility], presenting for the following health issues:  No chief complaint on file.        8/22/2023     2:15 PM   Additional Questions   Roomed by Yadira NARAYAN   Accompanied by Mom       History of Present Illness       Mental Health Follow-up:  Patient presents to follow-up on Depression & Anxiety.Patient's depression since last visit has been:  No change  The patient is not having other symptoms associated with depression.  Patient's anxiety since last visit has been:  No change  The patient is not having other symptoms associated with anxiety.  Any significant life events: No  Patient is feeling anxious or having panic attacks.  Patient has no concerns about alcohol or drug use.    Diabetes:   He presents for follow up of diabetes.    He is not checking blood glucose.        He is concerned about other.   He is having weight gain.    " "    He consumes 3 sweetened beverage(s) daily.He exercises with enough effort to increase his heart rate 9 or less minutes per day.  He exercises with enough effort to increase his heart rate 3 or less days per week. He is missing 1 dose(s) of medications per week.  He is not taking prescribed medications regularly due to remembering to take.     Here with his mom.   Has h/o anxiety, panic attacks, depressed mood, anger outbursts.   Seeing psychiatry. On treatment , not controlled. Smokes marihuana daily, seems to help him relax.   Not doing any exercise, not interested in seeing people, communicating, social events.   Concern for ED and no libido.   Reports diarrhea and left lower abdomen  pain. Has episodes of nausea.   Discussed preventive measures.           Review of Systems   Constitutional, HEENT, cardiovascular, pulmonary, gi and gu systems are negative, except as otherwise noted.      Objective    /89 (BP Location: Left arm, Cuff Size: Adult Regular)   Pulse 77   Temp 98  F (36.7  C) (Tympanic)   Resp 18   Ht 1.746 m (5' 8.75\")   Wt 85.6 kg (188 lb 12.8 oz)   SpO2 95%   BMI 28.08 kg/m    Body mass index is 28.08 kg/m .  Physical Exam   GENERAL: healthy, alert and no distress  NECK: no adenopathy, no asymmetry, masses, or scars and thyroid normal to palpation  RESP: lungs clear to auscultation - no rales, rhonchi or wheezes  CV: regular rate and rhythm, normal S1 S2, no S3 or S4, no murmur, click or rub, no peripheral edema and peripheral pulses strong  ABDOMEN: soft, nontender, no hepatosplenomegaly, no masses and bowel sounds normal  MS: no gross musculoskeletal defects noted, no edema    Admission on 04/15/2022, Discharged on 04/15/2022   Component Date Value Ref Range Status    Hold Specimen 04/15/2022 JI   Final    Hold Specimen 04/15/2022 JI   Final    Hold Specimen 04/15/2022 [facility]   Final    Hold Specimen 04/15/2022 [facility]   Final    Sodium 04/15/2022 131 (L)  133 - 144 mmol/L Final    " Potassium 04/15/2022 3.5  3.4 - 5.3 mmol/L Final    Chloride 04/15/2022 100  94 - 109 mmol/L Final    Carbon Dioxide (CO2) 04/15/2022 26  20 - 32 mmol/L Final    Anion Gap 04/15/2022 5  3 - 14 mmol/L Final    Urea Nitrogen 04/15/2022 10  7 - 30 mg/dL Final    Creatinine 04/15/2022 0.82  0.66 - 1.25 mg/dL Final    Calcium 04/15/2022 8.7  8.5 - 10.1 mg/dL Final    Glucose 04/15/2022 113 (H)  70 - 99 mg/dL Final    Alkaline Phosphatase 04/15/2022 60  40 - 150 U/L Final    AST 04/15/2022 55 (H)  0 - 45 U/L Final    ALT 04/15/2022 88 (H)  0 - 70 U/L Final    Protein Total 04/15/2022 7.4  6.8 - 8.8 g/dL Final    Albumin 04/15/2022 3.8  3.4 - 5.0 g/dL Final    Bilirubin Total 04/15/2022 0.4  0.2 - 1.3 mg/dL Final    GFR Estimate 04/15/2022 >90  >60 mL/min/1.73m2 Final    Effective December 21, 2021 eGFRcr in adults is calculated using the 2021 CKD-EPI creatinine equation which includes age and gender (Antonio et al., [place], DOI: 10.1056/DIUYfj9494450)    Lipase 04/15/2022 52 (L)  73 - 393 U/L Final    Influenza A PCR 04/15/2022 Positive (A)  Negative Final    Influenza B PCR 04/15/2022 Negative  Negative Final    RSV PCR 04/15/2022 Negative  Negative Final    SARS CoV2 PCR 04/15/2022 Negative  Negative Final    NEGATIVE: SARS-CoV-2 (COVID-19) RNA not detected, presumed negative.    WBC Count 04/15/2022 5.0  4.0 - 11.0 10e3/uL Final    RBC Count 04/15/2022 4.90  4.40 - 5.90 10e6/uL Final    Hemoglobin 04/15/2022 15.0  13.3 - 17.7 g/dL Final    Hematocrit 04/15/2022 43.2  40.0 - 53.0 % Final    MCV 04/15/2022 88  78 - 100 fL Final    MCH 04/15/2022 30.6  26.5 - 33.0 pg Final    MCHC 04/15/2022 34.7  31.5 - 36.5 g/dL Final    RDW 04/15/2022 12.0  10.0 - 15.0 % Final    Platelet Count 04/15/2022 94 (L)  150 - 450 10e3/uL Final    % Neutrophils 04/15/2022 69  % Final    % Lymphocytes 04/15/2022 12  % Final    % Monocytes 04/15/2022 19  % Final    % Eosinophils 04/15/2022 0  % Final    % Basophils 04/15/2022 0  % Final    %  Immature Granulocytes 04/15/2022 0  % Final    NRBCs per 100 WBC 04/15/2022 0  <1 /100 Final    Absolute Neutrophils 04/15/2022 3.4  1.6 - 8.3 10e3/uL Final    Absolute Lymphocytes 04/15/2022 0.6 (L)  0.8 - 5.3 10e3/uL Final    Absolute Monocytes 04/15/2022 0.9  0.0 - 1.3 10e3/uL Final    Absolute Eosinophils 04/15/2022 0.0  0.0 - 0.7 10e3/uL Final    Absolute Basophils 04/15/2022 0.0  0.0 - 0.2 10e3/uL Final    Absolute Immature Granulocytes 04/15/2022 0.0  <=0.4 10e3/uL Final    Absolute NRBCs 04/15/2022 0.0  10e3/uL Final

## 2023-08-23 LAB — SHBG SERPL-SCNC: 35 NMOL/L (ref 11–80)

## 2023-08-24 LAB
TESTOST FREE SERPL-MCNC: 9.12 NG/DL
TESTOST SERPL-MCNC: 456 NG/DL (ref 240–950)

## 2023-08-24 RX ORDER — TADALAFIL 10 MG/1
10 TABLET ORAL DAILY PRN
Qty: 12 TABLET | Refills: 3 | Status: SHIPPED | OUTPATIENT
Start: 2023-08-24

## 2023-08-28 ENCOUNTER — TELEPHONE (OUTPATIENT)
Dept: INTERNAL MEDICINE | Facility: CLINIC | Age: 28
End: 2023-08-28
Payer: COMMERCIAL

## 2023-08-28 NOTE — TELEPHONE ENCOUNTER
Prior Authorization Retail Medication Request    Medication/Dose: tadalafil (CIALIS) 10 MG tablet  ICD code (if different than what is on RX):  Drug-induced erectile dysfunction [N52.2]   Previously Tried and Failed:    Rationale:      Insurance Name:    Insurance ID:        Pharmacy Information (if different than what is on RX)  Name:    Phone:

## 2023-08-30 ENCOUNTER — VIRTUAL VISIT (OUTPATIENT)
Dept: PSYCHOLOGY | Facility: CLINIC | Age: 28
End: 2023-08-30
Payer: COMMERCIAL

## 2023-08-30 DIAGNOSIS — F33.2 SEVERE EPISODE OF RECURRENT MAJOR DEPRESSIVE DISORDER, WITHOUT PSYCHOTIC FEATURES (H): Primary | ICD-10-CM

## 2023-08-30 PROCEDURE — 90834 PSYTX W PT 45 MINUTES: CPT | Mod: 95

## 2023-08-30 NOTE — TELEPHONE ENCOUNTER
PRIOR AUTHORIZATION DENIED    Medication: TADALAFIL 10 MG PO TABS  Insurance Company: [employer]Special[employer] PA's - Phone [phone] Fax [phone]  Denial Date: 8/30/2023  Denial Rational: Plan exclusion    Appeal Information: N/A  Patient Notified: No

## 2023-08-30 NOTE — TELEPHONE ENCOUNTER
PA denied for Tadalafil.     Was pt made aware that insurance does not cover this type of medication?     May be able to pay cash through [employer] or [employer] have lower prices.       Please advise.

## 2023-08-31 NOTE — PROGRESS NOTES
[facility]                                     Progress Note    Patient Name: Favio Will  Date: 8/30/23          Service Type: Individual      Session Start Time: 3:30 pm  Session End Time: 4:22 pm     Session Length: 52 minutes    Session #: 5    Attendees: Client attended alone    Service Modality:  Video Visit:      Provider verified identity through the following two step process.  Patient provided:  Patient is known previously to provider    Telemedicine Visit: The patient's condition can be safely assessed and treated via synchronous audio and visual telemedicine encounter.      Reason for Telemedicine Visit: Patient has requested telehealth visit    Originating Site (Patient Location): Patient's home    Distant Site (Provider Location): Provider Remote Setting- Home Office    Consent:  The patient/guardian has verbally consented to: the potential risks and benefits of telemedicine (video visit) versus in person care; bill my insurance or make self-payment for services provided; and responsibility for payment of non-covered services.     Patient would like the video invitation sent by:  My Chart    Mode of Communication:  Video Conference via Amwell    Distant Location (Provider):  Off-site    As the provider I attest to compliance with applicable laws and regulations related to telemedicine.    DATA  Interactive Complexity: No  Crisis: No        Progress Since Last Session (Related to Symptoms / Goals / Homework):   Symptoms: Improving - reports improved symptoms.    Homework: Achieved / completed to satisfaction      Episode of Care Goals: Minimal progress - ACTION (Actively working towards change); Intervened by reinforcing change plan / affirming steps taken     Current / Ongoing Stressors and Concerns:   Depression symptoms, previous depression symptoms not managed well with prior medications, loss of a girlfriend, loss of his job last January, history of substance use abuse.       Treatment Objective(s) Addressed in This Session:   Discuss motivation / ambivalence about taking anti-depressant / mood stabilizer medication(s)     Intervention:  Time in session was spent on exploring patient's mental illness symptoms, symptoms with and without medications. Patient identified the difficulty of accepting the mental illness and the need to take the medications. He reports he feels better (improved emotional regulation) with the medications. Writer used encouragement, validation, and reflective listening skills to help increase change talk.      Assessments completed prior to visit:  The following assessments were completed by patient for this visit:  none       ASSESSMENT: Current Emotional / Mental Status (status of significant symptoms):   Risk status (Self / Other harm or suicidal ideation)   Patient denies current fears or concerns for personal safety.   Patient reports the following current or recent suicidal ideation or behaviors: - passive SI, denies plan, denies intent, contracts for safety.   Patient denies current or recent homicidal ideation or behaviors.   Patient denies current or recent self injurious behavior or ideation.   Patient denies other safety concerns.   Patient reports there has been no change in risk factors since their last session.     Patient reports there has been no change in protective factors since their last session.     A safety and risk management plan has been developed including: Patient consented to co-developed safety plan on 6/15/23.  Safety and risk management plan was reviewed.   Patient agreed to use safety plan should any safety concerns arise.  A copy was made available to the patient.     Appearance:   Appropriate    Eye Contact:   Good    Psychomotor Behavior: Normal    Attitude:   Cooperative    Orientation:   All   Speech    Rate / Production: Talkative    Volume:  Normal    Mood:    Sad    Affect:    Appropriate    Thought Content:  Clear     Thought Form:  Logical    Insight:    Fair      Medication Review:   No changes to current psychiatric medication(s)     Medication Compliance:   Yes     Changes in Health Issues:   None reported     Chemical Use Review:   Substance Use: patient reports smoking cannabis to help with his anxiety symptoms. Patient reports the same level of use.     Tobacco Use: No current tobacco use.      Diagnosis:  1. Severe episode of recurrent major depressive disorder, without psychotic features (H)          Collateral Reports Completed:   None    PLAN: (Patient Tasks / Therapist Tasks / Other)  Patient will attend testing tomorrow (for diagnostic clarity). Patient may have bi-polar I. Patient reports testing is through his psychiatry office: RASHEED Carrion [facility]  [address] [place] [address]   Phone: [phone]     Fax: [phone]   He plans to continue to take his medications, as prescribed, continues to agree to contract for safety, and will meet with his new therapist on 9/8/23 at 1:30 pm (Yi Gonzalez). 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  Patient would like to decrease symptoms, improve level of functioning, return to work, and build a life he wants to live.  He currently declines CD treatment, and declines IOP.   Patient is newly medication compliant and is actively working with his psychiatry provider, and has strong family supports.    Elvi Mejía MA, [facility]  8/31/23      This note has been reviewed and I agree with the plan of care. This note is co-signed by JOVAN Warren, [facility], Supervisor, on: 9/4/23                                            ______________________________________________________________________    Individual Treatment Plan    Patient's Name: Favio Will  YOB: 1995    Date of Creation: 7/28/23  Date Treatment Plan Last Reviewed/Revised: 7/28/23    DSM5 Diagnoses: 296.33 (F33.2) Major Depressive Disorder, Recurrent Episode, Severe _ or 300.02 (F41.1)  Generalized Anxiety Disorder  Psychosocial / Contextual Factors: unemployed, loss of a romantic relationship  PROMIS (reviewed every 90 days): The following assessments were completed by patient for this visit:  PROMIS 10-Global Health (only subscores and total score):       3/27/2023     4:06 PM 6/15/2023    10:37 AM   PROMIS-10 Scores Only   Global Mental Health Score 4 4   Global Physical Health Score 6 7   PROMIS TOTAL - SUBSCORES 10 11       Referral / Collaboration:  The following referral(s) was/were discussed but patient declines follow up at this time. IOP/PHP programming . Patient declined to sign ROIs for writer to speak with patient's new psychiatrist and with his parents, at this time.    Anticipated number of session for this episode of care: 9-12 sessions  Anticipation frequency of session:  weekly and every other week .  Anticipated Duration of each session: 38-52 minutes  Treatment plan will be reviewed in 90 days or when goals have been changed.     MeasurableTreatment Goal(s) related to diagnosis / functional impairment(s)  Goal 1: Patient will decrease depression and anxiety symptoms.      Objective #A (Patient Action)                          Patient will Identify negative self-talk and behaviors: challenge core beliefs, myths, and actions.  Status: New- 7/28/23     Intervention(s)  Therapist will teach CBT, how to challenge core beliefs, and will assign homework to help patient change the core beliefs.     Objective #B  Patient will Increase interest, engagement, and pleasure in doing things.   Status: New- 7/28/23     Intervention(s)  Therapist will assign homework to help patient increase interest, engagement, and pleasure in doing things.     Objective #C  Patient will increase understanding of steps in the grief process.   Status: New- 7/28/23     Intervention(s)  Therapist will teach steps in the grief process, use encouragement, validation, and supportive listening to help patient process  his grief.     Objective #D  Patient will explore and process his ambivalence regarding taking psychotropic medications.     Intervention(s)   Therapist will help patient better understand his beliefs about taking psychotropic  medications, barriers, pros and cons, and will provide psychoeducation.     Objective #E   Patient will practice using healthy coping skills.   Status: New- 7/28/23     Intervention(s)   Therapist will teach healthy coping skills, help patient to identify effective skills, and will  assign homework.        Patient has not reviewed nor agreed to the above plan.    Elvi Mejía MA, [facility]  7/30/23  Treatment plan reviewed and clinical supervision by JOVAN Hooks, [facility] 8/3/2023

## 2023-09-08 ENCOUNTER — VIRTUAL VISIT (OUTPATIENT)
Dept: PSYCHOLOGY | Facility: CLINIC | Age: 28
End: 2023-09-08
Payer: COMMERCIAL

## 2023-09-08 DIAGNOSIS — F33.2 SEVERE EPISODE OF RECURRENT MAJOR DEPRESSIVE DISORDER, WITHOUT PSYCHOTIC FEATURES (H): Primary | ICD-10-CM

## 2023-09-08 DIAGNOSIS — F41.1 GAD (GENERALIZED ANXIETY DISORDER): ICD-10-CM

## 2023-09-08 PROCEDURE — 90785 PSYTX COMPLEX INTERACTIVE: CPT | Mod: 95

## 2023-09-08 PROCEDURE — 90837 PSYTX W PT 60 MINUTES: CPT | Mod: 95

## 2023-09-08 ASSESSMENT — PATIENT HEALTH QUESTIONNAIRE - PHQ9
10. IF YOU CHECKED OFF ANY PROBLEMS, HOW DIFFICULT HAVE THESE PROBLEMS MADE IT FOR YOU TO DO YOUR WORK, TAKE CARE OF THINGS AT HOME, OR GET ALONG WITH OTHER PEOPLE: VERY DIFFICULT
SUM OF ALL RESPONSES TO PHQ QUESTIONS 1-9: 24
SUM OF ALL RESPONSES TO PHQ QUESTIONS 1-9: 24

## 2023-09-08 NOTE — PROGRESS NOTES
[facility]                                     Progress Note    Patient Name: Favio Will  Date: 9/08/23          Service Type: Individual      Session Start Time: 1:30 pm  Session End Time: 2:41pm     Session Length: 53+    Session #: 1 (new provider)    Attendees: Client attended alone    Service Modality:  Video Visit:      Provider verified identity through the following two step process.  Patient provided:  Patient is known previously to provider    Telemedicine Visit: The patient's condition can be safely assessed and treated via synchronous audio and visual telemedicine encounter.      Reason for Telemedicine Visit: Patient has requested telehealth visit    Originating Site (Patient Location): Patient's home    Distant Site (Provider Location): Provider Remote Setting- Home Office    Consent:  The patient/guardian has verbally consented to: the potential risks and benefits of telemedicine (video visit) versus in person care; bill my insurance or make self-payment for services provided; and responsibility for payment of non-covered services.     Patient would like the video invitation sent by:  My Chart    Mode of Communication:  Video Conference via Amwell    Distant Location (Provider):  Off-site    As the provider I attest to compliance with applicable laws and regulations related to telemedicine.    DATA  Interactive Complexity: No  Crisis: No  Extended Session (53+ minutes):   - Patient's presenting concerns require more intensive intervention than could be completed within the usual service  Interactive Complexity: Yes, visit entailed Interactive Complexity evidenced by:  -The need to manage maladaptive communication (related to, e.g., high anxiety, high reactivity, repeated questions, or disagreement) among participants that complicates delivery of care          Progress Since Last Session (Related to Symptoms / Goals / Homework):   Symptoms: No change in anxious and depressive  "sxs    Homework: Achieved / completed to satisfaction      Episode of Care Goals: Minimal progress - ACTION (Actively working towards change); Intervened by reinforcing change plan / affirming steps taken     Current / Ongoing Stressors and Concerns:   Significant depressive and anxious sxs, previous depression symptoms not managed well with prior medications, loss of a girlfriend, loss of his job last January, history of substance use abuse  (opioate, alcohol, marijuana), social isolation, financial difficulties, emotional regulation, \"abandonment issues\"    Patient's identified coping strategies: music, nature-[place], fishing, bringing the dog, going for walks       Treatment Objective(s) Addressed in This Session:   Discuss motivation / ambivalence about taking anti-depressant / mood stabilizer medication(s)     Intervention:    Motivational Interviewing  Target Behavior:  Medication Compliance    Stage of Change: ACTION (Actively working towards change)    MI Intervention: Expressed Empathy/Understanding, Open-ended questions, Importance Scale (1-10) 10/10 Confidence Scale (1-10) (Provider did not assess) , and encouraged patient to follow-up with psychiatric provider with questions and concerns.      Change Talk Expressed by the Patient: Reasons to change Activation Patient has been compliant with medication currently but still has ambivalence.     Provider Response to Change Talk: A - Affirmed patient's thoughts, decisions, or attempts at behavior change    Provider made an introduction and established care with patient.  Provider held space as patient processed experiences and stressors using reflective listening, validation and normalization of patient's emotional response.  Provider worked to develop greater insight.  Provider validated and challenged patient.    Provider assessed for safety given patient's report of ongoing suicidal ideation (See risk assessment).  Patient committed to safety and stated " that he still had access to the safety plan he had developed with his previous provider.    Provider assessed patient's mental health sxs and substance use.  Provider engaged patient in discussion around substance use, and assessed patient's motivation for change.  Provider encouraged patient to reflect on the purpose his substance use currently serves, which patient was agreeable to.        Extended Session (53+ minutes):   - Patient's presenting concerns require more intensive intervention than could be completed within the usual service  Interactive Complexity: Yes, visit entailed Interactive Complexity evidenced by:  -The need to manage maladaptive communication (related to, e.g., high anxiety, high reactivity, repeated questions, or disagreement) among participants that complicates delivery of care    Assessments completed prior to visit:  The following assessments were completed by patient for this visit:  none       ASSESSMENT: Current Emotional / Mental Status (status of significant symptoms):   Risk status (Self / Other harm or suicidal ideation)   Patient denies current fears or concerns for personal safety.   Patient reports the following current or recent suicidal ideation or behaviors: Patient reported ongoing, consistent SI around not wanting to be here.  Patient denied thoughts of actually killing himself, method plan or intent.  Patient committed to safety between now at the next session  .    Patient denies current or recent homicidal ideation or behaviors.   Patient denies current or recent self injurious behavior or ideation.   Patient denies other safety concerns.   Patient reports there has been no change in risk factors since their last session.     Patient reports there has been no change in protective factors since their last session.     A safety and risk management plan has been developed including: Patient consented to co-developed safety plan on 6/15/23.  Safety and risk management plan  was reviewed.   Patient agreed to use safety plan should any safety concerns arise.  A copy was made available to the patient.     Appearance:   Appropriate    Eye Contact:   Good    Psychomotor Behavior: Normal    Attitude:   Cooperative    Orientation:   All   Speech    Rate / Production: Talkative    Volume:  Normal    Mood:    Sad    Affect:    Appropriate    Thought Content:  Clear    Thought Form:  Logical    Insight:    Fair      Medication Review:   No changes to current psychiatric medication(s)     Medication Compliance:   Yes     Changes in Health Issues:   None reported     Chemical Use Review:   Substance Use: patient reports smoking cannabis to help with his anxiety symptoms. Patient reports the same level of use.     Tobacco Use: No current tobacco use.      Diagnosis:  1. Severe episode of recurrent major depressive disorder, without psychotic features (H)    2. RENATA (generalized anxiety disorder)      Collateral Reports Completed:   None    PLAN: (Patient Tasks / Therapist Tasks / Other)  Patient reports testing is through his psychiatry office: RASHEED Carrion [address] [place] [address]   Phone: [phone]     Fax: [phone]   Patient has committed to safety and will discuss concerns around medication with his psychiatry provider.   Patient would like to decrease symptoms, improve level of functioning, return to work, and build a life he wants to live.  He currently declines CD treatment, and declines IOP.     Provider will begin to teach psychoeducation around behavioral activation and coping strategies.      Yi Gonzalez [facility] 9/8/23                Service Performed and Documented by LGTILA-   Note reviewed and clinical supervision by JOVAN Thomas [facility] 9/18/2023                            ______________________________________________________________________    Individual Treatment Plan    Patient's Name: Favio Will  YOB: 1995    Date of  Creation: 7/28/23  Date Treatment Plan Last Reviewed/Revised: 7/28/23    DSM5 Diagnoses: 296.33 (F33.2) Major Depressive Disorder, Recurrent Episode, Severe _ or 300.02 (F41.1) Generalized Anxiety Disorder  Psychosocial / Contextual Factors: unemployed, loss of a romantic relationship  PROMIS (reviewed every 90 days): The following assessments were completed by patient for this visit:  PROMIS 10-Global Health (only subscores and total score):       3/27/2023     4:06 PM 6/15/2023    10:37 AM   PROMIS-10 Scores Only   Global Mental Health Score 4 4   Global Physical Health Score 6 7   PROMIS TOTAL - SUBSCORES 10 11       Referral / Collaboration:  The following referral(s) was/were discussed but patient declines follow up at this time. IOP/PHP programming . Patient declined to sign ROIs for writer to speak with patient's new psychiatrist and with his parents, at this time.    Anticipated number of session for this episode of care: 9-12 sessions  Anticipation frequency of session:  weekly and every other week .  Anticipated Duration of each session: 38-52 minutes  Treatment plan will be reviewed in 90 days or when goals have been changed.     MeasurableTreatment Goal(s) related to diagnosis / functional impairment(s)  Goal 1: Patient will decrease depression and anxiety symptoms.      Objective #A (Patient Action)                          Patient will Identify negative self-talk and behaviors: challenge core beliefs, myths, and actions.  Status: New- 7/28/23     Intervention(s)  Therapist will teach CBT, how to challenge core beliefs, and will assign homework to help patient change the core beliefs.     Objective #B  Patient will Increase interest, engagement, and pleasure in doing things.   Status: New- 7/28/23     Intervention(s)  Therapist will assign homework to help patient increase interest, engagement, and pleasure in doing things.     Objective #C  Patient will increase understanding of steps in the grief  process.   Status: New- 7/28/23     Intervention(s)  Therapist will teach steps in the grief process, use encouragement, validation, and supportive listening to help patient process his grief.     Objective #D  Patient will explore and process his ambivalence regarding taking psychotropic medications.     Intervention(s)   Therapist will help patient better understand his beliefs about taking psychotropic  medications, barriers, pros and cons, and will provide psychoeducation.     Objective #E   Patient will practice using healthy coping skills.   Status: New- 7/28/23     Intervention(s)   Therapist will teach healthy coping skills, help patient to identify effective skills, and will  assign homework.        Patient has not reviewed nor agreed to the above plan.    Elvi Mejía MA, LPC  7/30/23, reviewed by Yi Gonzalez [facility]    Safety Plan:  Adult Short Safety Plan:   Name: Favio Will  YOB: 1995  Date: Lisa 15, 2023   My primary care provider:   Javi Denson MD  Internal Medicine      My primary care clinic: [place]  My prescriber: Leandra Daniels  Other care team support:  Elvi Mejía - counselor    My Triggers:  Relationship conflict - ex partner       Additional People, Places, and Things that I can access for support:   My dad.  Emergency contact: Georgina Will - Biological dad [phone]      What is important to me and makes life worth living: Parents and dog.             GREEN     Good Control  1. I feel good  2. No suicidal thoughts   3. Can work, sleep and play        Action Steps  1. Self-care: balanced meals, exercising, sleep practices, etc.  2. Take your medications as prescribed.  3. Continue meetings with therapist and prescriber.  4.  Do the healthy things that I enjoy.             YELLOW  Getting Worse  I have ANY of these:  1. I do not feel good  2. Difficulty Concentrating  3. Sleep is changing  4. Increase/Change in my thoughts to hurt self and/or others, but I can  still manage and not act on it.   5. Not taking care of self.             Action Steps (in addition to the above):  1. Inform your therapist and psychiatric prescriber/PCP.  2. Keep taking your medications as prescribed.    3. Turn to people you can ask for help.  4. Use internal coping strategies -see below.  5. Create safe environment: lock and limit medications                RED  Get Help  If I have ANY of these:  1. Current and uncontrollable thoughts and/or behaviors to hurt self and/or others.   2.     Actions to manage my safety  1. Contact your emergency person: my dad  2. Call or Text [phone]  3. Call my crisis team- [facility] [phone]  3. Or Call 911 or go to the emergency room right away         My Internal Coping Strategies include the following:  Go on Youtube and watching Youtube, and music.

## 2023-11-13 ENCOUNTER — TELEPHONE (OUTPATIENT)
Dept: PSYCHOLOGY | Facility: CLINIC | Age: 28
End: 2023-11-13
Payer: COMMERCIAL

## 2023-11-13 NOTE — TELEPHONE ENCOUNTER
"    HPI: Joselin Henderson is a 77yr old female with a history of   dizziness and head pressure    She has not seen me since in a year    She states her right buttocks hurts at times, sometimes impairing her walking with pain  but sometimes also noted with rest. She has pain in the lower back and tailbone. She takes OTC meds PRN but this does not help much      Dizziness/ off balance sensation continues with position and head changes          Head pain is again described as "feeling funny" but not a clear headache as prior    Upper neck pain is well controlled currently    Anxiety about her health continues but she also has anxiety about her family.  Her son  in the past year and has some grief    No CTS symptoms    Tremor is tolerable    Pending hem/onc follow up  for MGUS     Review of Systems  Review of Systems   Constitutional:  Negative for fever.   HENT:  Negative for hearing loss and tinnitus.    Eyes:  Negative for double vision.   Respiratory:  Negative for hemoptysis.    Cardiovascular:  Negative for leg swelling.   Gastrointestinal:  Negative for blood in stool.   Genitourinary:  Negative for hematuria.   Musculoskeletal:  Positive for back pain.   Skin:  Negative for rash.   Neurological:  Positive for dizziness.   Endo/Heme/Allergies:  Does not bruise/bleed easily.       I have reviewed all of this patient's past medical and surgical histories as well as family and social histories and active allergies and medications as documented in the electronic medical record.      Objective:   Exam:  Gen Appearance, well developed/nourished in no apparent distress  CV: 2+ distal pulses with no edema or swelling  Neuro:  MS: Awake, alert,  Sustains attention. Recent/remote memory intact, Language is full to spontaneous speech/repetition/naming/comprehension. Fund of Knowledge is full  CN: Optic discs are flat with normal vasculature, PERRL, Extraoccular movements and visual fields are full. Normal facial " Documentation Only: Provider called patient again to connect directly and confirm that he was no longer interested in therapy at this time (Provider had previously attempted to call on 10/30) .  Patient did not answer and Provider left a message.  Provider reiterated that outpatient therapy is voluntary and stated that the patient would be discharged if he does not reach out to schedule.  Provider stated that patient is welcome to reach out to intake or to this provider to schedule in the future if he is interested.  No safety concerns identified at this time.  No further action needed.    Yi Gonzalez, [facility] 11/13/23   sensation and strength, Hearing symmetric, Tongue and Palate are midline and strong. Shoulder Shrug symmetric and strong.  Motor: Normal bulk, tone, no abnormal movements. 5/5 strength bilateral upper/lower extremities with 2+ reflexes and no clonus  Sensory: symmetric to  temp, and vibration,  Romberg negative  Cerebellar: Finger-nose,Heal-shin, Rapid alternating movements intact  Gait: Normal stance, no ataxia        Imaging:MRI 2010 Cervical spine : IMPRESSION:     1. NO EVIDENCE OF NEURAL FORAMINAL OR SPINAL CANAL STENOSIS AT ANY   LEVEL.   2. MILD ANTEROLISTHESIS OF C5 ON 6 WITH A MILD POSTERIOR BROAD BASED   DISC BULGE WITH A SUPERIMPOSED CENTRAL DISC PROTRUSION EFFACING THE   ANTERIOR THECAL SLEEVE BUT NOT DEMONSTRATING ANY SIGNIFICANT CANAL   STENOSIS.   3. MILD MUCOUS MEMBRANE THICKENING IN THE MAXILLARY AND SPHENOID   SINUSES.     3/2016 EMG: Carpal Tunnel Syndrome which is severe on the right, mild on the left    10/2017 MRI Brain: Impression:  1.  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  2.  1.2 cm prepontine cystic structure, possibly in arachnoid cyst.    MRI C, T and L spine 2018: 1. Multilevel spondylosis, most pronounced at L1-L2 with there is moderate spinal canal stenosis and mild neural foraminal narrowing bilaterally.  2. Grade 1 anterolisthesis of C3 on C4 and L4 on L5.  3. L4 vertebral body hemangioma, stable when compared to MRI dated 06/01/2010.    2/2020 MRI Brain: No changes    4/2022 MRI C spine: 1. Multilevel cervical spondylosis.  Right paracentral disc-osteophyte at C5-C6 with mild spinal canal encroachment and effacement along the anterior subarachnoid space and spinal cord.  2. Posterior disc-osteophyte with effacement along the anterior subarachnoid space at C4-C5 slightly more prominent than on the prior study.  3. Minimal anterolisthesis of C3 on C4 with a minimal posterior disc-osteophyte and mild effacement along the anterior margin of the  subarachnoid space.  Mild right-sided foraminal encroachment.     4/2022 MRI Brain: 1.  No MRI evidence of an acute intracranial abnormality.     2.  Mild atrophy and minimal small vessel ischemic changes of the periventricular white matter.  Stable appearing 1.2 cm arachnoid cyst anterior to the lam.     3.  No significant interval change when compared with the previous study of May 1, 2020.      Assessment/ Recommendations:    Joselin Henderson is a 77 y.o. female who saw me in 2016 for  right hand numbness but also neck pain and right arm pain. EMG showed CTS bilaterally, she is s/p CTR on the right . She is sent back in 2017 with complaint of dizziness. MRI Brain 10/2017 showed vascular loop of the right vertebral artery which abuts the right 8th nerve    1. Referred to neurosurgery in 2017 regarding cerebral vascular loop and cerebral cystic structure (arachonoid cyst) and this was not believed to be related to her symptoms. She was instructed by Neurosurgery to follow up with PT for vestibular rehab and with Dr Gaitan, ENT but did not comply  -Patient presented in 2020 with 6 months of head pressure (bilateral occipital and frontal) and requested re-imaging to relieve her anxiety about her health  -Updated MRI brain 2020 and 2022: Unchanged  -Otherwise, she refused treatment for head pain. States she would not take po meds, can't afford PT and refused pain management consult about her more chronic spinal pain. ON blocks would be an option as well, but she refused    2. She returned in 2022 with continued off balance symptoms and neck more than head pain.  -Updated MRI C spine per showed mild spinal stenosis  -Pain management or PT consult can be used PRN    3.  We previously discussed her anxiety about her health and how  reassurance which may not be medically necessary and further feds her anxiety about this. As her MRI brain is unchanged long term, continued imaging is not medically necessary unless new or  worse symptoms  -Offered anxiety treatment/ she declines.  Discussed counseling as an option    3. CTS on the left is asymptomatic    4. Note Spinal MRIs in  2018 showed moderate spinal stenosis at L1-2 and some C and L spine anterolisthesis   -She is having lumbar radicular pain  - Pain management consult per orders    5. Note positive MATEO evaluated by rheumatology prior    6. Monoclonal gammopathy of undetermined significance without any numbness or neuropathy symptoms in the feet  -followed by hematology    7. Has benign essential tremor with family history of the same. Does not desire treatment.     RTC     Social determinants of health limit this patient's evaluation and treatment

## 2023-11-21 ENCOUNTER — FCC EXTENDED DOCUMENTATION (OUTPATIENT)
Dept: PSYCHOLOGY | Facility: CLINIC | Age: 28
End: 2023-11-21
Payer: COMMERCIAL

## 2023-11-21 NOTE — PROGRESS NOTES
"                    Discharge Summary  Single Session    Client Name: Favio Will MRN#: [id number] YOB: 1995      Intake / Discharge Date: 7/14/23 (previous therapist)-11/21/23      DSM5 Diagnoses: (Sustained by DSM5 Criteria Listed Above)  Diagnoses: 296.33 (F33.2) Major Depressive Disorder, Recurrent Episode, Severe _  300.02 (F41.1) Generalized Anxiety Disorder  Psychosocial & Contextual Factors: unemployed, loss of a romantic relationship   WHODAS 2.0 (12 item) Score: N/A          Presenting Concern:  \"Anxiety, Depression, Suicidal thoughts, ADHD, OCD, Sexual issues\".  Difficulties with rage, depression, anxiety, persistent SI thoughts (currently passive)    Reason for Discharge:  Client did not return      Disposition at Time of Last Encounter:   Comments:   Patient was experiencing significant depressive and anxious sxs, difficulties with emotional regulation, significant marijuana use, social isolation and difficulty with motivation.  Patient was experiencing passive SI and denied thoughts of killing himself, method plan or intent.  Patient was taking medication as prescribed.  Patient endorsed future-oriented thinking and committed to safety until the next session.     Risk Management:   Client has had a history of suicidal ideation: polysubstance abuse  A safety and risk management plan has been developed including: Patient consented to co-developed safety plan on 6/15/23.  Safety and risk management plan was reviewed.   Patient agreed to use safety plan should any safety concerns arise.  A copy was made available to the patient.      Referred To:  Patient can reach out to intake or to this provider to reschedule at anytime.         Yi Gonzalez, [facility]   11/21/2023     Safety Plan:  Adult Short Safety Plan:   Name: Favio Will  YOB: 1995  Date: Lisa 15, 2023   My primary care provider:   Javi Denson MD  Internal Medicine      My primary care clinic: [facility]" prescriber: Leandra Daniels  Other care team support:  Elvi Mejía - counselor    My Triggers:  Relationship conflict - ex partner       Additional People, Places, and Things that I can access for support:   My dad.  Emergency contact: Georgina Will - Biological dad [phone]      What is important to me and makes life worth living: Parents and dog.             GREEN     Good Control  1. I feel good  2. No suicidal thoughts   3. Can work, sleep and play        Action Steps  1. Self-care: balanced meals, exercising, sleep practices, etc.  2. Take your medications as prescribed.  3. Continue meetings with therapist and prescriber.  4.  Do the healthy things that I enjoy.             YELLOW  Getting Worse  I have ANY of these:  1. I do not feel good  2. Difficulty Concentrating  3. Sleep is changing  4. Increase/Change in my thoughts to hurt self and/or others, but I can still manage and not act on it.   5. Not taking care of self.             Action Steps (in addition to the above):  1. Inform your therapist and psychiatric prescriber/PCP.  2. Keep taking your medications as prescribed.    3. Turn to people you can ask for help.  4. Use internal coping strategies -see below.  5. Create safe environment: lock and limit medications                RED  Get Help  If I have ANY of these:  1. Current and uncontrollable thoughts and/or behaviors to hurt self and/or others.   2.     Actions to manage my safety  1. Contact your emergency person: my dad  2. Call or Text [phone]  3. Call my crisis team- [facility] [phone]  3. Or Call [phone] or go to the emergency room right away         My Internal Coping Strategies include the following:  Go on Youtube and watching Youtube, and music.   Nature-kayak, fishing, bringing the dog, going for walks

## 2024-06-02 ENCOUNTER — HEALTH MAINTENANCE LETTER (OUTPATIENT)
Age: 29
End: 2024-06-02

## 2024-09-09 ENCOUNTER — MEDICAL CORRESPONDENCE (OUTPATIENT)
Dept: HEALTH INFORMATION MANAGEMENT | Facility: CLINIC | Age: 29
End: 2024-09-09

## 2024-09-25 ENCOUNTER — OFFICE VISIT (OUTPATIENT)
Dept: INTERNAL MEDICINE | Facility: CLINIC | Age: 29
End: 2024-09-25
Attending: INTERNAL MEDICINE
Payer: COMMERCIAL

## 2024-09-25 VITALS
HEIGHT: 69 IN | SYSTOLIC BLOOD PRESSURE: 109 MMHG | DIASTOLIC BLOOD PRESSURE: 76 MMHG | BODY MASS INDEX: 28.14 KG/M2 | RESPIRATION RATE: 16 BRPM | HEART RATE: 59 BPM | WEIGHT: 190 LBS | OXYGEN SATURATION: 97 % | TEMPERATURE: 98.3 F

## 2024-09-25 DIAGNOSIS — F52.32 ANORGASMIA OF MALE: ICD-10-CM

## 2024-09-25 DIAGNOSIS — E78.5 HYPERLIPIDEMIA LDL GOAL <130: ICD-10-CM

## 2024-09-25 DIAGNOSIS — Z00.00 ENCOUNTER FOR PREVENTATIVE ADULT HEALTH CARE EXAMINATION: Primary | ICD-10-CM

## 2024-09-25 DIAGNOSIS — F31.81 BIPOLAR 2 DISORDER (H): ICD-10-CM

## 2024-09-25 LAB
ALBUMIN SERPL BCG-MCNC: 4.8 G/DL (ref 3.5–5.2)
ALP SERPL-CCNC: 77 U/L (ref 40–150)
ALT SERPL W P-5'-P-CCNC: 48 U/L (ref 0–70)
ANION GAP SERPL CALCULATED.3IONS-SCNC: 12 MMOL/L (ref 7–15)
AST SERPL W P-5'-P-CCNC: 44 U/L (ref 0–45)
BILIRUB SERPL-MCNC: 0.5 MG/DL
BUN SERPL-MCNC: 14.3 MG/DL (ref 6–20)
CALCIUM SERPL-MCNC: 9.5 MG/DL (ref 8.8–10.4)
CHLORIDE SERPL-SCNC: 100 MMOL/L (ref 98–107)
CHOLEST SERPL-MCNC: 196 MG/DL
CREAT SERPL-MCNC: 0.9 MG/DL (ref 0.67–1.17)
EGFRCR SERPLBLD CKD-EPI 2021: >90 ML/MIN/1.73M2
ERYTHROCYTE [DISTWIDTH] IN BLOOD BY AUTOMATED COUNT: 11.7 % (ref 10–15)
FASTING STATUS PATIENT QL REPORTED: YES
FASTING STATUS PATIENT QL REPORTED: YES
GLUCOSE SERPL-MCNC: 78 MG/DL (ref 70–99)
HCO3 SERPL-SCNC: 26 MMOL/L (ref 22–29)
HCT VFR BLD AUTO: 45.5 % (ref 40–53)
HDLC SERPL-MCNC: 40 MG/DL
HGB BLD-MCNC: 15.9 G/DL (ref 13.3–17.7)
LDLC SERPL CALC-MCNC: 132 MG/DL
MCH RBC QN AUTO: 30.8 PG (ref 26.5–33)
MCHC RBC AUTO-ENTMCNC: 34.9 G/DL (ref 31.5–36.5)
MCV RBC AUTO: 88 FL (ref 78–100)
NONHDLC SERPL-MCNC: 156 MG/DL
PLATELET # BLD AUTO: 179 10E3/UL (ref 150–450)
POTASSIUM SERPL-SCNC: 5.2 MMOL/L (ref 3.4–5.3)
PROT SERPL-MCNC: 7.9 G/DL (ref 6.4–8.3)
RBC # BLD AUTO: 5.16 10E6/UL (ref 4.4–5.9)
SODIUM SERPL-SCNC: 138 MMOL/L (ref 135–145)
TRIGL SERPL-MCNC: 118 MG/DL
TSH SERPL DL<=0.005 MIU/L-ACNC: 1.25 UIU/ML (ref 0.3–4.2)
VALPROATE SERPL-MCNC: 68.2 UG/ML
WBC # BLD AUTO: 8.6 10E3/UL (ref 4–11)

## 2024-09-25 PROCEDURE — 84443 ASSAY THYROID STIM HORMONE: CPT | Performed by: INTERNAL MEDICINE

## 2024-09-25 PROCEDURE — 85027 COMPLETE CBC AUTOMATED: CPT | Performed by: INTERNAL MEDICINE

## 2024-09-25 PROCEDURE — 84403 ASSAY OF TOTAL TESTOSTERONE: CPT | Performed by: INTERNAL MEDICINE

## 2024-09-25 PROCEDURE — 80053 COMPREHEN METABOLIC PANEL: CPT | Performed by: INTERNAL MEDICINE

## 2024-09-25 PROCEDURE — 80164 ASSAY DIPROPYLACETIC ACD TOT: CPT | Performed by: INTERNAL MEDICINE

## 2024-09-25 PROCEDURE — 99395 PREV VISIT EST AGE 18-39: CPT | Performed by: INTERNAL MEDICINE

## 2024-09-25 PROCEDURE — 99213 OFFICE O/P EST LOW 20 MIN: CPT | Mod: 25 | Performed by: INTERNAL MEDICINE

## 2024-09-25 PROCEDURE — 36415 COLL VENOUS BLD VENIPUNCTURE: CPT | Performed by: INTERNAL MEDICINE

## 2024-09-25 PROCEDURE — 80061 LIPID PANEL: CPT | Performed by: INTERNAL MEDICINE

## 2024-09-25 ASSESSMENT — PAIN SCALES - GENERAL: PAINLEVEL: NO PAIN (0)

## 2024-09-25 ASSESSMENT — PATIENT HEALTH QUESTIONNAIRE - PHQ9
10. IF YOU CHECKED OFF ANY PROBLEMS, HOW DIFFICULT HAVE THESE PROBLEMS MADE IT FOR YOU TO DO YOUR WORK, TAKE CARE OF THINGS AT HOME, OR GET ALONG WITH OTHER PEOPLE: EXTREMELY DIFFICULT
SUM OF ALL RESPONSES TO PHQ QUESTIONS 1-9: 14
SUM OF ALL RESPONSES TO PHQ QUESTIONS 1-9: 14

## 2024-09-25 NOTE — PROGRESS NOTES
"Preventive Care Visit  [facility]  Javi Denson MD, Internal Medicine  Sep 25, 2024      Assessment & Plan     Anorgasmia of male  Likely symptoms are related to his psychotropic medications   Advised to discuss with psychiatry  Will assess lab  Refer to urology   - Adult Urology  Referral; Future  - Testosterone, total    Encounter for preventative adult health care examination  advised regular aerobic activity, low cholesterol, low salt diet, wearing seat belt,  self examinations, sunscreen protection.Obtain screening cholesterol, immunizations reviewed.    - Lipid panel reflex to direct LDL Fasting  - CBC with platelets  - Comprehensive metabolic panel (BMP + Alb, Alk Phos, ALT, AST, Total. Bili, TP)  - TSH with free T4 reflex  - Valproic acid  - Testosterone, total    Bipolar 2 disorder (H)  Reports controlled symptoms on treatment     - Valproic acid    Hyperlipidemia LDL goal <130  Diet and exercise advised   Monitor lab     Patient has been advised of split billing requirements and indicates understanding: Yes        BMI  Estimated body mass index is 27.82 kg/m  as calculated from the following:    Height as of this encounter: 1.76 m (5' 9.3\").    Weight as of this encounter: 86.2 kg (190 lb).   Weight management plan: Discussed healthy diet and exercise guidelines    Depression Screening Follow Up        9/25/2024     2:08 PM   PHQ   PHQ-9 Total Score 14   Q9: Thoughts of better off dead/self-harm past 2 weeks Not at all         9/25/2024     2:08 PM   Last PHQ-9   1.  Little interest or pleasure in doing things 1   2.  Feeling down, depressed, or hopeless 3   3.  Trouble falling or staying asleep, or sleeping too much 1   4.  Feeling tired or having little energy 3   5.  Poor appetite or overeating 3   6.  Feeling bad about yourself 1   7.  Trouble concentrating 1   8.  Moving slowly or restless 1   Q9: Thoughts of better off dead/self-harm past 2 weeks 0   PHQ-9 " Total Score 14         Follow Up Actions Taken       Counseling  Appropriate preventive services were addressed with this patient via screening, questionnaire, or discussion as appropriate for fall prevention, nutrition, physical activity, Tobacco-use cessation, social engagement, weight loss and cognition.  Checklist reviewing preventive services available has been given to the patient.  Reviewed patient's diet, addressing concerns and/or questions.   Patient is at risk for social isolation and has been provided with information about the benefit of social connection.   The patient's PHQ-9 score is consistent with moderate depression. He was provided with information regarding depression.       See Patient Instructions    Rodriguez Stahl is a 29 year old, presenting for the following:  Physical        9/25/2024     2:14 PM   Additional Questions   Roomed by hope r   Accompanied by mom         9/25/2024     2:15 PM   Patient Reported Additional Medications   Patient reports taking the following new medications depakote 500 mg 1x/day        Health Care Directive  Patient does not have a Health Care Directive or Living Will: Discussed advance care planning with patient; however, patient declined at this time.    HPI    Patient has bipolar disorder and OCD.   Seeing psychiatry. Symptoms are controlled.   Uses cannabis in addition to prescribed medications.   Has concern for problems getting orgasms, unable to ejaculate and has decreased sensory perception in his genitals.   Has normal erections.   Has h/o hyperlipidemia. Keeps exercise , trying to eat healthy. Has lost 8 lbs.               9/25/2024   General Health   How would you rate your overall physical health? (!) POOR   Feel stress (tense, anxious, or unable to sleep) Very much      (!) STRESS CONCERN      9/25/2024   Nutrition   Three or more servings of calcium each day? (!) NO   Diet: I don't know   How many servings of fruit and vegetables per day? (!)  2-3   How many sweetened beverages each day? (!) 2            9/25/2024   Exercise   Days per week of moderate/strenous exercise 6 days            9/25/2024   Social Factors   Frequency of gathering with friends or relatives Never   Worry food won't last until get money to buy more No   Food not last or not have enough money for food? No   Do you have housing? (Housing is defined as stable permanent housing and does not include staying ouside in a car, in a tent, in an abandoned building, in an overnight shelter, or couch-surfing.) Yes   Are you worried about losing your housing? No   Lack of transportation? No   Unable to get utilities (heat,electricity)? No      (!) SOCIAL CONNECTIONS CONCERN      9/25/2024   Dental   Dentist two times every year? Yes            9/25/2024   TB Screening   Were you born outside of the [place]? No          Today's PHQ-9 Score:       9/25/2024     2:08 PM   PHQ-9 SCORE   PHQ-9 Total Score MyChart 14 (Moderate depression)   PHQ-9 Total Score 14         9/25/2024   Substance Use   Alcohol more than 3/day or more than 7/wk Not Applicable   Do you use any other substances recreationally? No        Social History     Tobacco Use    Smoking status: Former     Current packs/day: 0.50     Types: Cigarettes    Smokeless tobacco: Never   Vaping Use    Vaping status: Former    Substances: THC, CBD   Substance Use Topics    Alcohol use: Yes     Comment: not much, sometimes on weekends    Drug use: Yes     Types: Marijuana     Comment: daily use of marijauna           9/25/2024   STI Screening   New sexual partner(s) since last STI/HIV test? No            9/25/2024   Contraception/Family Planning   Questions about contraception or family planning No           Reviewed and updated as needed this visit by Provider                    Lab work is in process  Labs reviewed in EPIC      Review of Systems  Constitutional, HEENT, cardiovascular, pulmonary, GI, , musculoskeletal, neuro, skin, endocrine and  "psych systems are negative, except as otherwise noted.     Objective    Exam  /76 (BP Location: Left arm, Patient Position: Sitting, Cuff Size: Adult Regular)   Pulse 59   Temp 98.3  F (36.8  C) (Oral)   Resp 16   Ht 1.76 m (5' 9.3\")   Wt 86.2 kg (190 lb)   SpO2 97%   BMI 27.82 kg/m     Estimated body mass index is 27.82 kg/m  as calculated from the following:    Height as of this encounter: 1.76 m (5' 9.3\").    Weight as of this encounter: 86.2 kg (190 lb).    Physical Exam  GENERAL: alert and no distress  EYES: Eyes grossly normal to inspection, PERRL and conjunctivae and sclerae normal  HENT: ear canals and TM's normal, nose and mouth without ulcers or lesions  NECK: no adenopathy, no asymmetry, masses, or scars  RESP: lungs clear to auscultation - no rales, rhonchi or wheezes  CV: regular rate and rhythm, normal S1 S2, no S3 or S4, no murmur, click or rub, no peripheral edema  ABDOMEN: soft, nontender, no hepatosplenomegaly, no masses and bowel sounds normal  MS: no gross musculoskeletal defects noted, no edema  SKIN: no suspicious lesions or rashes  NEURO: Normal strength and tone, mentation intact and speech normal  PSYCH: mentation appears normal, affect normal/bright        Signed Electronically by: Javi Denson MD    Answers submitted by the patient for this visit:  Patient Health Questionnaire (Submitted on 9/25/2024)  If you checked off any problems, how difficult have these problems made it for you to do your work, take care of things at home, or get along with other people?: Extremely difficult  PHQ9 TOTAL SCORE: 14    "

## 2024-09-25 NOTE — LETTER
September 30, 2024      Favio Will  [address] [address] [id number]        Dear ,    We are writing to inform you of your test results.    Your test results fall within the expected range(s) or remain unchanged from previous results.  Please continue with current treatment plan.    Resulted Orders   Lipid panel reflex to direct LDL Fasting   Result Value Ref Range    Cholesterol 196 <200 mg/dL    Triglycerides 118 <150 mg/dL    Direct Measure HDL 40 >=40 mg/dL    LDL Cholesterol Calculated 132 (H) <100 mg/dL    Non HDL Cholesterol 156 (H) <130 mg/dL    Patient Fasting > 8hrs? Yes     Narrative    Cholesterol  Desirable: < 200 mg/dL  Borderline High: 200 - 239 mg/dL  High: >= 240 mg/dL    Triglycerides  Normal: < 150 mg/dL  Borderline High: 150 - 199 mg/dL  High: 200-499 mg/dL  Very High: >= 500 mg/dL    Direct Measure HDL  Female: >= 50 mg/dL   Male: >= 40 mg/dL    LDL Cholesterol  Desirable: < 100 mg/dL  Above Desirable: 100 - 129 mg/dL   Borderline High: 130 - 159 mg/dL   High:  160 - 189 mg/dL   Very High: >= 190 mg/dL    Non HDL Cholesterol  Desirable: < 130 mg/dL  Above Desirable: 130 - 159 mg/dL  Borderline High: 160 - 189 mg/dL  High: 190 - 219 mg/dL  Very High: >= 220 mg/dL   CBC with platelets   Result Value Ref Range    WBC Count 8.6 4.0 - 11.0 10e3/uL    RBC Count 5.16 4.40 - 5.90 10e6/uL    Hemoglobin 15.9 13.3 - 17.7 g/dL    Hematocrit 45.5 40.0 - 53.0 %    MCV 88 78 - 100 fL    MCH 30.8 26.5 - 33.0 pg    MCHC 34.9 31.5 - 36.5 g/dL    RDW 11.7 10.0 - 15.0 %    Platelet Count 179 150 - 450 10e3/uL   Comprehensive metabolic panel (BMP + Alb, Alk Phos, ALT, AST, Total. Bili, TP)   Result Value Ref Range    Sodium 138 135 - 145 mmol/L    Potassium 5.2 3.4 - 5.3 mmol/L    Carbon Dioxide (CO2) 26 22 - 29 mmol/L    Anion Gap 12 7 - 15 mmol/L    Urea Nitrogen 14.3 6.0 - 20.0 mg/dL    Creatinine 0.90 0.67 - 1.17 mg/dL    GFR Estimate >90 >60 mL/min/1.73m2      Comment:      eGFR calculated  using 2021 CKD-EPI equation.    Calcium 9.5 8.8 - 10.4 mg/dL      Comment:      Reference intervals for this test were updated on 7/16/2024 to reflect our healthy population more accurately. There may be differences in the flagging of prior results with similar values performed with this method. Those prior results can be interpreted in the context of the updated reference intervals.    Chloride 100 98 - 107 mmol/L    Glucose 78 70 - 99 mg/dL    Alkaline Phosphatase 77 40 - 150 U/L    AST 44 0 - 45 U/L    ALT 48 0 - 70 U/L    Protein Total 7.9 6.4 - 8.3 g/dL    Albumin 4.8 3.5 - 5.2 g/dL    Bilirubin Total 0.5 <=1.2 mg/dL    Patient Fasting > 8hrs? Yes    TSH with free T4 reflex   Result Value Ref Range    TSH 1.25 0.30 - 4.20 uIU/mL   Valproic acid   Result Value Ref Range    Valproic acid 68.2   ug/mL      Comment:      Therapeutic Range:  ug/mL   Epilepsy and gisella patients:  ug/mL   Some patients require and can tolerate values up to 150 ug/mL     Critical: Greater than 150 ug/mL   Testosterone, total   Result Value Ref Range    Testosterone Total 420 240 - 950 ng/dL       If you have any questions or concerns, please call the clinic at the number listed above.       Sincerely,      Javi Denson MD

## 2024-09-27 ENCOUNTER — TELEPHONE (OUTPATIENT)
Dept: INTERNAL MEDICINE | Facility: CLINIC | Age: 29
End: 2024-09-27
Payer: COMMERCIAL

## 2024-09-27 LAB — TESTOST SERPL-MCNC: 420 NG/DL (ref 240–950)

## 2024-09-27 NOTE — TELEPHONE ENCOUNTER
Patient brought a lab request to his appointment with Dr Denson. Lab results faxed as requested to [phone].

## 2024-11-09 ASSESSMENT — PATIENT HEALTH QUESTIONNAIRE - PHQ9: SUM OF ALL RESPONSES TO PHQ QUESTIONS 1-9: 26

## 2025-01-28 ENCOUNTER — OFFICE VISIT (OUTPATIENT)
Dept: UROLOGY | Facility: CLINIC | Age: 30
End: 2025-01-28
Attending: INTERNAL MEDICINE
Payer: COMMERCIAL

## 2025-01-28 VITALS — SYSTOLIC BLOOD PRESSURE: 110 MMHG | BODY MASS INDEX: 27.82 KG/M2 | DIASTOLIC BLOOD PRESSURE: 74 MMHG | WEIGHT: 190 LBS

## 2025-01-28 DIAGNOSIS — F52.32 ANORGASMIA OF MALE: Primary | ICD-10-CM

## 2025-01-28 PROCEDURE — 99204 OFFICE O/P NEW MOD 45 MIN: CPT | Performed by: NURSE PRACTITIONER

## 2025-01-28 ASSESSMENT — PAIN SCALES - GENERAL: PAINLEVEL_OUTOF10: NO PAIN (0)

## 2025-01-28 NOTE — LETTER
"1/28/2025       RE: Favio Will  [address] [facility] [address]     Dear Colleague,    Thank you for referring your patient, Favio Will, to the [facility]. Please see a copy of my visit note below.      Urology Outpatient Visit    Name: Favio Will    MRN: [id number]   YOB: 1995               Chief Complaint:   Anorgasmia         Impression and Plan:   Impression / Plan:   Favio Will is a 30 year old male with anorgasmia. Likely due to any combination of medication related side effects, psychogenic/psychosomatic, pelvic floor dysfunction. Presentation inconsistent with genitopelvic dysesthesia or endocrine etiology.       - I recommend he meet with his psychiatrist to consider an alternative medication regimen.  - Pelvic Floor PT referral placed for assessment/Tx of pelvic floor dysfunction.  - [facility].     Thank you for the opportunity to participate in the care of Favio Will.     RASHEED Lilly, CNP   [facility] - Department of Urology  [phone]          History of Present Illness:   Favio Will is a pleasant 30 year old male with Hx of daily medical cannabis use, Bipolar 2 disorder, MDD, anxiety, HLD, Hx benzodiazepine withdrawal, here today to discuss anorgasmia. He reports he is able to attain and maintain erections, and ejaculate, however, he reports that he is \"not able to feel much\" with lack of pleasure upon ejaculation. He does not have any urinary issues or pelvic pain. He takes Depakote and Buspar daily. He reports he has had this problem since about 2017, but in the last year or so his symptoms have been more prevalent. This may have been associated with a medication change. He recently had his testosterone checked a couple months ago which was 420 ng/dL. TSH was WNL. Valproic acid levels were checked which were " within therapeutic range as well. He reports he previously took Adderall (this was not prescribed) on occasion, which completely alleviated his mental and physical symptoms. He notes it was too high of a dose for him, but he was able to go about his ADLs instead of isolating.     History is obtained from patient & EMR    CT ABDOMEN AND PELVIS WITH CONTRAST 4/15/2022 2:46 PM   IMPRESSION:   1.  No acute cause for abdominal pain demonstrated.  2.  Fatty infiltration of the liver noted.          Past Medical History:     Past Medical History:   Diagnosis Date     Chemical dependency (H)     daily marijuana use.      Other specified visual disturbances           Past Surgical History:     Past Surgical History:   Procedure Laterality Date     NO HISTORY OF SURGERY            Social History:     Social History     Tobacco Use     Smoking status: Former     Current packs/day: 0.50     Types: Cigarettes     Smokeless tobacco: Never   Substance Use Topics     Alcohol use: Yes     Comment: not much, sometimes on weekends          Family History:     Family History   Problem Relation Age of Onset     C.A.D. No family hx of      Diabetes No family hx of      Cerebrovascular Disease No family hx of           Allergies:     Allergies   Allergen Reactions     No Known Drug Allergy           Medications:     Current Outpatient Medications   Medication Sig Dispense Refill     busPIRone (BUSPAR) 7.5 MG tablet Take 1 tablet by mouth 2 times daily       divalproex sodium extended-release (DEPAKOTE ER) 500 MG 24 hr tablet Take one pill nightly for 3 nights then two pills nightly 60 tablet 0     hydrOXYzine (VISTARIL) 25 MG capsule TAKE 1 TO 2 CAPSULES BY MOUTH DAILY AS NEEDED FOR ANXIETY OR SLEEP       mirtazapine (REMERON) 7.5 MG tablet Take 7.5 mg by mouth nightly as needed       propranolol (INDERAL) 20 MG tablet Take one tablet twice a day as needed for anxiety. Take 30-60 minutes before an anxiety provoking event 45 tablet 0      "tadalafil (CIALIS) 10 MG tablet Take 1 tablet (10 mg) by mouth daily as needed (ED) (Patient not taking: Reported on 1/28/2025) 12 tablet 3     No current facility-administered medications for this visit.          Review of Systems:    ROS: See HPI for pertinent details.  Remainder of 10-point ROS negative.         Physical Exam:   VS:  T: Data Unavailable    HR: Data Unavailable    BP: 110/74    RR: Data Unavailable     GEN:  Alert.  NAD.   HEENT:  Sclerae anicteric.    CV:  No obvious jugular venous distension.  LUNGS: No respiratory distress, breathing comfortably wo accessory muscle use.  ABD:  ND.     SKIN:  Dry. No visible rashes.   NEURO:  CN grossly intact.          Laboratory Data:   No results found for: \"PSA\"  Lab Results   Component Value Date    CR 0.90 09/25/2024    CR 0.83 08/22/2023    CR 0.82 04/15/2022    CR 0.78 02/07/2022    CR 0.84 12/16/2012    CR 0.70 02/25/2010    CR 0.70 11/23/2009     Lab Results   Component Value Date    GFRESTIMATED >90 09/25/2024    GFRESTIMATED >90 08/22/2023    GFRESTIMATED >90 04/15/2022    GFRESTIMATED >90 02/07/2022    GFRESTIMATED >90 12/16/2012    GFRESTIMATED >90 02/25/2010    GFRESTIMATED GFR not calculated, patient <16 years old. 11/23/2009          Again, thank you for allowing me to participate in the care of your patient.      Sincerely,    RASHEED Skaggs CNP    "

## 2025-01-28 NOTE — PROGRESS NOTES
"  Urology Outpatient Visit    Name: Favio Will    MRN: [id number]   YOB: 1995               Chief Complaint:   Anorgasmia         Impression and Plan:   Impression / Plan:   Favio Will is a 30 year old male with anorgasmia. Likely due to any combination of medication related side effects, psychogenic/psychosomatic, pelvic floor dysfunction. Presentation inconsistent with genitopelvic dysesthesia or endocrine etiology.       - I recommend he meet with his psychiatrist to consider an alternative medication regimen.  - Pelvic Floor PT referral placed for assessment/Tx of pelvic floor dysfunction.  - [facility].     Thank you for the opportunity to participate in the care of Favio Will.     RASHEED Lilly, CNP  [facility] - Department of Urology  [phone]          History of Present Illness:   Favio Will is a pleasant 30 year old male with Hx of daily medical cannabis use, Bipolar 2 disorder, MDD, anxiety, HLD, Hx benzodiazepine withdrawal, here today to discuss anorgasmia. He reports he is able to attain and maintain erections, and ejaculate, however, he reports that he is \"not able to feel much\" with lack of pleasure upon ejaculation. He does not have any urinary issues or pelvic pain. He takes Depakote and Buspar daily. He reports he has had this problem since about 2017, but in the last year or so his symptoms have been more prevalent. This may have been associated with a medication change. He recently had his testosterone checked a couple months ago which was 420 ng/dL. TSH was WNL. Valproic acid levels were checked which were within therapeutic range as well. He reports he previously took Adderall (this was not prescribed) on occasion, which completely alleviated his mental and physical symptoms. He notes it was too high of a dose for him, but he was able to go about his ADLs instead of isolating.     History is obtained from patient & " EMR    CT ABDOMEN AND PELVIS WITH CONTRAST 4/15/2022 2:46 PM   IMPRESSION:   1.  No acute cause for abdominal pain demonstrated.  2.  Fatty infiltration of the liver noted.          Past Medical History:     Past Medical History:   Diagnosis Date    Chemical dependency (H)     daily marijuana use.     Other specified visual disturbances           Past Surgical History:     Past Surgical History:   Procedure Laterality Date    NO HISTORY OF SURGERY            Social History:     Social History     Tobacco Use    Smoking status: Former     Current packs/day: 0.50     Types: Cigarettes    Smokeless tobacco: Never   Substance Use Topics    Alcohol use: Yes     Comment: not much, sometimes on weekends          Family History:     Family History   Problem Relation Age of Onset    C.A.D. No family hx of     Diabetes No family hx of     Cerebrovascular Disease No family hx of           Allergies:     Allergies   Allergen Reactions    No Known Drug Allergy           Medications:     Current Outpatient Medications   Medication Sig Dispense Refill    busPIRone (BUSPAR) 7.5 MG tablet Take 1 tablet by mouth 2 times daily      divalproex sodium extended-release (DEPAKOTE ER) 500 MG 24 hr tablet Take one pill nightly for 3 nights then two pills nightly 60 tablet 0    hydrOXYzine (VISTARIL) 25 MG capsule TAKE 1 TO 2 CAPSULES BY MOUTH DAILY AS NEEDED FOR ANXIETY OR SLEEP      mirtazapine (REMERON) 7.5 MG tablet Take 7.5 mg by mouth nightly as needed      propranolol (INDERAL) 20 MG tablet Take one tablet twice a day as needed for anxiety. Take 30-60 minutes before an anxiety provoking event 45 tablet 0    tadalafil (CIALIS) 10 MG tablet Take 1 tablet (10 mg) by mouth daily as needed (ED) (Patient not taking: Reported on 1/28/2025) 12 tablet 3     No current facility-administered medications for this visit.          Review of Systems:    ROS: See HPI for pertinent details.  Remainder of 10-point ROS negative.         Physical Exam:  "  VS:  T: Data Unavailable    HR: Data Unavailable    BP: 110/74    RR: Data Unavailable     GEN:  Alert.  NAD.   HEENT:  Sclerae anicteric.    CV:  No obvious jugular venous distension.  LUNGS: No respiratory distress, breathing comfortably wo accessory muscle use.  ABD:  ND.     SKIN:  Dry. No visible rashes.   NEURO:  CN grossly intact.          Laboratory Data:   No results found for: \"PSA\"  Lab Results   Component Value Date    CR 0.90 09/25/2024    CR 0.83 08/22/2023    CR 0.82 04/15/2022    CR 0.78 02/07/2022    CR 0.84 12/16/2012    CR 0.70 02/25/2010    CR 0.70 11/23/2009     Lab Results   Component Value Date    GFRESTIMATED >90 09/25/2024    GFRESTIMATED >90 08/22/2023    GFRESTIMATED >90 04/15/2022    GFRESTIMATED >90 02/07/2022    GFRESTIMATED >90 12/16/2012    GFRESTIMATED >90 02/25/2010    GFRESTIMATED GFR not calculated, patient <16 years old. 11/23/2009          "

## 2025-01-28 NOTE — NURSING NOTE
Chief Complaint   Patient presents with    Anorgasmia     Pt has no concerns with urination.  Pt denies gross hematuria or dysuria.    Jenni Wolf, Clinic Assistant